# Patient Record
Sex: MALE | Race: WHITE | HISPANIC OR LATINO | Employment: OTHER | URBAN - METROPOLITAN AREA
[De-identification: names, ages, dates, MRNs, and addresses within clinical notes are randomized per-mention and may not be internally consistent; named-entity substitution may affect disease eponyms.]

---

## 2017-08-07 ENCOUNTER — HOSPITAL ENCOUNTER (EMERGENCY)
Facility: HOSPITAL | Age: 57
Discharge: HOME | End: 2017-08-07
Attending: EMERGENCY MEDICINE
Payer: MEDICAID

## 2017-08-07 VITALS
HEART RATE: 95 BPM | TEMPERATURE: 98 F | SYSTOLIC BLOOD PRESSURE: 145 MMHG | OXYGEN SATURATION: 99 % | BODY MASS INDEX: 38.8 KG/M2 | RESPIRATION RATE: 18 BRPM | DIASTOLIC BLOOD PRESSURE: 79 MMHG | HEIGHT: 68 IN | WEIGHT: 256 LBS

## 2017-08-07 DIAGNOSIS — E86.0 DEHYDRATION: Primary | ICD-10-CM

## 2017-08-07 LAB
ALBUMIN SERPL BCP-MCNC: 3.7 G/DL
ALP SERPL-CCNC: 88 U/L
ALT SERPL W/O P-5'-P-CCNC: 18 U/L
ANION GAP SERPL CALC-SCNC: 4 MMOL/L
AST SERPL-CCNC: 17 U/L
BASOPHILS # BLD AUTO: 0.03 K/UL
BASOPHILS NFR BLD: 0.3 %
BILIRUB SERPL-MCNC: 0.2 MG/DL
BILIRUB UR QL STRIP: NEGATIVE
BUN SERPL-MCNC: 17 MG/DL
CALCIUM SERPL-MCNC: 9.8 MG/DL
CHLORIDE SERPL-SCNC: 105 MMOL/L
CK SERPL-CCNC: 220 U/L
CLARITY UR: CLEAR
CO2 SERPL-SCNC: 31 MMOL/L
COLOR UR: YELLOW
CREAT SERPL-MCNC: 1 MG/DL
DIFFERENTIAL METHOD: ABNORMAL
EOSINOPHIL # BLD AUTO: 0.2 K/UL
EOSINOPHIL NFR BLD: 1.9 %
ERYTHROCYTE [DISTWIDTH] IN BLOOD BY AUTOMATED COUNT: 13.2 %
EST. GFR  (AFRICAN AMERICAN): >60 ML/MIN/1.73 M^2
EST. GFR  (NON AFRICAN AMERICAN): >60 ML/MIN/1.73 M^2
GLUCOSE SERPL-MCNC: 91 MG/DL
GLUCOSE UR QL STRIP: NEGATIVE
HCT VFR BLD AUTO: 45.5 %
HGB BLD-MCNC: 14.6 G/DL
HGB UR QL STRIP: NEGATIVE
KETONES UR QL STRIP: NEGATIVE
LEUKOCYTE ESTERASE UR QL STRIP: ABNORMAL
LIPASE SERPL-CCNC: 17 U/L
LYMPHOCYTES # BLD AUTO: 3.4 K/UL
LYMPHOCYTES NFR BLD: 28.8 %
MAGNESIUM SERPL-MCNC: 2.1 MG/DL
MCH RBC QN AUTO: 29.7 PG
MCHC RBC AUTO-ENTMCNC: 32.1 G/DL
MCV RBC AUTO: 93 FL
MICROSCOPIC COMMENT: NORMAL
MONOCYTES # BLD AUTO: 1.3 K/UL
MONOCYTES NFR BLD: 11.5 %
NEUTROPHILS # BLD AUTO: 6.7 K/UL
NEUTROPHILS NFR BLD: 57.5 %
NITRITE UR QL STRIP: NEGATIVE
PH UR STRIP: 5 [PH] (ref 5–8)
PLATELET # BLD AUTO: 247 K/UL
PMV BLD AUTO: 10.9 FL
POCT GLUCOSE: 112 MG/DL (ref 70–110)
POTASSIUM SERPL-SCNC: 4.3 MMOL/L
PROT SERPL-MCNC: 8.2 G/DL
PROT UR QL STRIP: NEGATIVE
RBC # BLD AUTO: 4.91 M/UL
RBC #/AREA URNS HPF: 1 /HPF (ref 0–4)
SODIUM SERPL-SCNC: 140 MMOL/L
SP GR UR STRIP: 1.02 (ref 1–1.03)
URN SPEC COLLECT METH UR: ABNORMAL
UROBILINOGEN UR STRIP-ACNC: NEGATIVE EU/DL
WBC # BLD AUTO: 11.65 K/UL
WBC #/AREA URNS HPF: 1 /HPF (ref 0–5)

## 2017-08-07 PROCEDURE — 82550 ASSAY OF CK (CPK): CPT

## 2017-08-07 PROCEDURE — 85025 COMPLETE CBC W/AUTO DIFF WBC: CPT

## 2017-08-07 PROCEDURE — 82962 GLUCOSE BLOOD TEST: CPT

## 2017-08-07 PROCEDURE — 83735 ASSAY OF MAGNESIUM: CPT

## 2017-08-07 PROCEDURE — 83690 ASSAY OF LIPASE: CPT

## 2017-08-07 PROCEDURE — 80053 COMPREHEN METABOLIC PANEL: CPT

## 2017-08-07 PROCEDURE — 99283 EMERGENCY DEPT VISIT LOW MDM: CPT | Mod: 25

## 2017-08-07 PROCEDURE — 81000 URINALYSIS NONAUTO W/SCOPE: CPT

## 2017-08-07 PROCEDURE — 96360 HYDRATION IV INFUSION INIT: CPT

## 2017-08-07 PROCEDURE — 25000003 PHARM REV CODE 250: Performed by: NURSE PRACTITIONER

## 2017-08-07 RX ADMIN — SODIUM CHLORIDE 1000 ML: 0.9 INJECTION, SOLUTION INTRAVENOUS at 07:08

## 2017-08-07 NOTE — ED PROVIDER NOTES
Triage Assessment:  I have evaluated and performed a medical screening assessment on this patient while awaiting a thorough evaluation and treatment in an ED bed. All of the emergency department beds are occupied at this time. When appropriate, laboratory testing and radiology studies will be ordered from triage. The patient has been advised of this process and care plan.    This is a 57 year old male with HTN and DM who presents to the ED with multiple medical complaints. These include generalized muscle cramps over the last 2-3 yrs as well as bilateral eye redness with drainage. Also complaints of left upper abdominal cramping. No N/V/D. He works in construction. He denies CP, SOB but does report intermittent wheezing. No wheezing right now.     Orders pending:  Labs    Dispo:  Maxwell Stokes NP  08/07/17 [id number]

## 2017-08-08 NOTE — ED PROVIDER NOTES
Encounter Date: 8/7/2017       History     Chief Complaint   Patient presents with    Spasms     muscle spasms to both leg, lower back, abdominal area x few years; more constant lately     CC: Muscle spasms    History of present illness: Patient is a 57-year-old male who presents for emergent consideration of muscle spasms that lasted for several years.  He states that they are in all major muscle groups intermittent nothing alleviates nothing aggravates he reports they're worsening and current severity is 5/10.  He has a history is negative for diabetes mellitus and hypertension.  He works in construction sites and renovating homes.    The history is provided by the patient. No  was used.     Review of patient's allergies indicates:  No Known Allergies  Past Medical History:   Diagnosis Date    Diabetes mellitus     Hypertension      History reviewed. No pertinent surgical history.  History reviewed. No pertinent family history.  Social History   Substance Use Topics    Smoking status: Current Every Day Smoker     Packs/day: 1.00    Smokeless tobacco: Never Used    Alcohol use No     Review of Systems   Constitutional: Negative for appetite change, chills, diaphoresis, fatigue and fever.   HENT: Negative for congestion, ear discharge, ear pain, postnasal drip, rhinorrhea, sinus pressure, sneezing, sore throat and voice change.    Eyes: Negative for discharge, itching and visual disturbance.   Respiratory: Negative for cough, shortness of breath and wheezing.    Cardiovascular: Negative for chest pain, palpitations and leg swelling.   Gastrointestinal: Negative for abdominal pain, nausea and vomiting.   Endocrine: Negative for polydipsia, polyphagia and polyuria.   Genitourinary: Negative for difficulty urinating, discharge, dysuria, frequency, hematuria, penile pain, penile swelling and urgency.   Musculoskeletal: Negative for arthralgias and myalgias.        Muscle spasms   Skin: Negative  for rash and wound.   Neurological: Negative for dizziness, seizures, syncope and weakness.   Hematological: Negative for adenopathy. Does not bruise/bleed easily.   Psychiatric/Behavioral: Negative for agitation and self-injury. The patient is not nervous/anxious.        Physical Exam     Initial Vitals [08/07/17 1657]   BP Pulse Resp Temp SpO2   (!) 147/86 103 20 98.3 °F (36.8 °C) 97 %      MAP       106.33         Physical Exam    Nursing note and vitals reviewed.  Constitutional: He appears well-developed and well-nourished. He is not diaphoretic. No distress.   HENT:   Head: Normocephalic and atraumatic.   Right Ear: External ear normal.   Left Ear: External ear normal.   Nose: Nose normal.   Eyes: Pupils are equal, round, and reactive to light. Right eye exhibits no discharge. Left eye exhibits no discharge. No scleral icterus.   Neck: Normal range of motion.   Pulmonary/Chest: No respiratory distress.   Abdominal: He exhibits no distension.   Musculoskeletal: Normal range of motion.   Neurological: He is alert and oriented to person, place, and time.   Skin: Skin is dry. Capillary refill takes less than 2 seconds.         ED Course   Procedures  Labs Reviewed   CBC W/ AUTO DIFFERENTIAL - Abnormal; Notable for the following:        Result Value    Mono # 1.3 (*)     All other components within normal limits    Narrative:     Recoll. [id number] by LM1 at 08/07/2017 18:36, reason:   HEMOLYZED/DISCARDED/KING   COMPREHENSIVE METABOLIC PANEL - Abnormal; Notable for the following:     CO2 31 (*)     Anion Gap 4 (*)     All other components within normal limits    Narrative:     Recoll. [id number] by LM1 at 08/07/2017 18:36, reason:   HEMOLYZED/DISCARDED/KING   CK - Abnormal; Notable for the following:      (*)     All other components within normal limits    Narrative:     Recoll. [id number] by LM1 at 08/07/2017 18:36, reason:   HEMOLYZED/DISCARDED/KING   URINALYSIS - Abnormal; Notable for the following:      Leukocytes, UA Trace (*)     All other components within normal limits   POCT GLUCOSE - Abnormal; Notable for the following:     POCT Glucose 112 (*)     All other components within normal limits   LIPASE    Narrative:     Recoll. [id number] by SARANGary at 08/07/2017 18:36, reason:   HEMOLYZED/DISCARDED/KING   MAGNESIUM   URINALYSIS MICROSCOPIC   POCT GLUCOSE MONITORING CONTINUOUS             Medical Decision Making:   Initial Assessment:   57 y.o. male presents for emergent evaluation of muscle spasms  Differential Diagnosis:   Dehydration, rhabdomyolysis, muscle spasms  ED Management:  Following a thorough history and physical, the patient underwent laboratory testing including urinalysis which was negative for infection including leukocytes, nitrites, protein, occult blood. UA is negative for infection, no nitrites, leukocytes, blood, or protein present.CBC was negative for leukocytosis  indicated negativity for systemic infection the H&H was stable and within normal limits and indicating negativity for anemia.The chemistry was negative for hypo-or hyper natremia, kalemia, chloridemia, or other electrolyte abnormalities; BUN and creatinine were within normal limits indicating normal kidney function, ALT and AST were within normal limits indicating normal liver function, there was no transaminitis.       CPK was 220 prior to IV rehydration glucose was 112.  Patient was given 1 L normal saline IV prior to discharge and was sent home with instructions to remain well-hydrated drinking fluids that  contain electrolytes while working and to take frequent breaks were working in the heat.  He should follow-up with his primary care provider or this issue or return to the emergency department for nonresolution.   Care of the patient discussed with Dr. Mitchell who agreed with the assessment and plan.                     ED Course     Clinical Impression:   The encounter diagnosis was Dehydration.    Disposition:    Disposition: Discharged  Condition: Stable                        Chris Quiñones, ADALBERTO  08/07/17 2032

## 2017-08-08 NOTE — DISCHARGE INSTRUCTIONS
Try to drink fluids that contain electrolytes when working.  Return to the Emergency department for any worsening or failure to improve, otherwise follow up with your primary care provider.

## 2018-05-25 ENCOUNTER — HOSPITAL ENCOUNTER (EMERGENCY)
Facility: HOSPITAL | Age: 58
Discharge: HOME | End: 2018-05-25
Attending: EMERGENCY MEDICINE
Payer: MEDICAID

## 2018-05-25 VITALS
HEIGHT: 68 IN | RESPIRATION RATE: 16 BRPM | BODY MASS INDEX: 40.32 KG/M2 | HEART RATE: 91 BPM | OXYGEN SATURATION: 96 % | SYSTOLIC BLOOD PRESSURE: 170 MMHG | DIASTOLIC BLOOD PRESSURE: 105 MMHG | TEMPERATURE: 99 F | WEIGHT: 266 LBS

## 2018-05-25 DIAGNOSIS — M10.9 GOUT OF ANKLE, UNSPECIFIED CAUSE, UNSPECIFIED CHRONICITY, UNSPECIFIED LATERALITY: ICD-10-CM

## 2018-05-25 DIAGNOSIS — I10 HYPERTENSION, UNSPECIFIED TYPE: ICD-10-CM

## 2018-05-25 DIAGNOSIS — Z76.0 MEDICATION REFILL: ICD-10-CM

## 2018-05-25 DIAGNOSIS — K02.9 PAIN DUE TO DENTAL CARIES: Primary | ICD-10-CM

## 2018-05-25 LAB — POCT GLUCOSE: 118 MG/DL (ref 70–110)

## 2018-05-25 PROCEDURE — 25000003 PHARM REV CODE 250: Performed by: PHYSICIAN ASSISTANT

## 2018-05-25 PROCEDURE — 82962 GLUCOSE BLOOD TEST: CPT

## 2018-05-25 PROCEDURE — 99284 EMERGENCY DEPT VISIT MOD MDM: CPT | Mod: 25

## 2018-05-25 PROCEDURE — 63600175 PHARM REV CODE 636 W HCPCS: Performed by: PHYSICIAN ASSISTANT

## 2018-05-25 RX ORDER — PREDNISONE 20 MG/1
20 TABLET ORAL DAILY
Qty: 4 TABLET | Refills: 0 | Status: SHIPPED | OUTPATIENT
Start: 2018-05-25 | End: 2018-05-29

## 2018-05-25 RX ORDER — DEXTROMETHORPHAN HYDROBROMIDE, GUAIFENESIN 5; 100 MG/5ML; MG/5ML
650 LIQUID ORAL EVERY 8 HOURS PRN
Qty: 30 TABLET | Refills: 0 | Status: SHIPPED | OUTPATIENT
Start: 2018-05-25

## 2018-05-25 RX ORDER — PREDNISONE 20 MG/1
20 TABLET ORAL
Status: COMPLETED | OUTPATIENT
Start: 2018-05-25 | End: 2018-05-25

## 2018-05-25 RX ORDER — LISINOPRIL 40 MG/1
40 TABLET ORAL DAILY
Qty: 30 TABLET | Refills: 0 | Status: SHIPPED | OUTPATIENT
Start: 2018-05-25 | End: 2020-03-01

## 2018-05-25 RX ORDER — ACETAMINOPHEN 500 MG
1000 TABLET ORAL
Status: COMPLETED | OUTPATIENT
Start: 2018-05-25 | End: 2018-05-25

## 2018-05-25 RX ORDER — PENICILLIN V POTASSIUM 250 MG/1
500 TABLET, FILM COATED ORAL
Status: COMPLETED | OUTPATIENT
Start: 2018-05-25 | End: 2018-05-25

## 2018-05-25 RX ORDER — PENICILLIN V POTASSIUM 500 MG/1
500 TABLET, FILM COATED ORAL 4 TIMES DAILY
Qty: 28 TABLET | Refills: 0 | Status: SHIPPED | OUTPATIENT
Start: 2018-05-25 | End: 2018-06-01

## 2018-05-25 RX ADMIN — ACETAMINOPHEN 1000 MG: 500 TABLET, FILM COATED ORAL at 10:05

## 2018-05-25 RX ADMIN — PENICILLIN V POTASSIUM 500 MG: 250 TABLET, FILM COATED ORAL at 10:05

## 2018-05-25 RX ADMIN — PREDNISONE 20 MG: 20 TABLET ORAL at 10:05

## 2018-05-26 NOTE — ED NOTES
Pt reports he has been taking aspirin for pain.  Pt reports his doctor no longer takes medicaid so he has been out of blood pressure medication.

## 2018-05-26 NOTE — ED PROVIDER NOTES
Encounter Date: 5/25/2018       History     Chief Complaint   Patient presents with    Jaw Pain    Dental Pain     Pt c/o bottom front teeth pain that now has his whole jaw and ears hurting.     Leg Pain     Pt c/o of bilateral leg pain.     Otalgia     Pt reports teeth pain that now radiates to his jaw and ears.      This is a 58-year-old male with hypertension, diabetes, gout who complains mouth pain that started with the lower incisors and has extended in both directions, and now with upper incisor pain. He reports pain is worse with eating and drinking, particularly cold drinks. He also reports bilateral ankle pain x1 week, similar to previous gout attacks. He has been off of his allopurinol. He denies injury, fever, rash.   Pt explains he had a PCP but they stopped taking medicaid and he has been out of his lisinopril for over 3 months.           Review of patient's allergies indicates:  No Known Allergies  Past Medical History:   Diagnosis Date    Diabetes mellitus     Hypertension      History reviewed. No pertinent surgical history.  History reviewed. No pertinent family history.  Social History   Substance Use Topics    Smoking status: Current Every Day Smoker     Packs/day: 1.00    Smokeless tobacco: Never Used    Alcohol use No     Review of Systems   Constitutional: Negative for fever.   HENT: Positive for dental problem. Negative for facial swelling, sore throat, trouble swallowing and voice change.    Respiratory: Negative for shortness of breath.    Cardiovascular: Negative for chest pain.   Gastrointestinal: Negative for nausea.   Genitourinary: Negative for dysuria.   Musculoskeletal: Positive for arthralgias (ankles) and joint swelling (ankles). Negative for back pain.   Skin: Negative for rash.   Neurological: Negative for weakness.   Hematological: Does not bruise/bleed easily.       Physical Exam     Initial Vitals [05/25/18 2154]   BP Pulse Resp Temp SpO2   (!) 170/105 91 16 98.6 °F  (37 °C) 96 %      MAP       126.67         Physical Exam    Vitals reviewed.  Constitutional: He appears well-developed and well-nourished. He is not diaphoretic. No distress.   HENT:   Head: Normocephalic and atraumatic.   Right Ear: External ear normal.   Left Ear: External ear normal.   Nose: Nose normal.   Eyes: Conjunctivae are normal. No scleral icterus.   Neck: Normal range of motion. Neck supple.   Cardiovascular: Normal rate, regular rhythm, normal heart sounds and intact distal pulses.   Pulmonary/Chest: Breath sounds normal. No respiratory distress. He has no wheezes. He has no rhonchi. He has no rales. He exhibits no tenderness.   Abdominal: Soft. He exhibits no distension and no mass. There is no tenderness. There is no rebound and no guarding.   Musculoskeletal: Normal range of motion. He exhibits edema and tenderness.   Bilateral ankles with mild edema and erythema.  Both are tender.  Normal range of motion.  Patient ambulatory without limp.     Neurological: He is alert and oriented to person, place, and time.   Skin: Skin is warm and dry. No rash noted. No erythema.         ED Course   Procedures  Labs Reviewed   POCT GLUCOSE - Abnormal; Notable for the following:        Result Value    POCT Glucose 118 (*)     All other components within normal limits             Medical Decision Making:   Initial Assessment:   50-year-old male with hypertension diabetes, and gout, with dental pain in mild acute gout flare.  No evidence of dental abscess or other significant odontogenic infection.  Bilateral ankles with mild tenderness, edema, erythema.  No fever chills. Low suspicion for infection.  Patient's pain same as previous gout flares.  Patient has not been on allopurinol.  Blood pressure elevated.  No chest pain or shortness of breath. He has been out of his lisinopril for 3 months, which he normally takes 40 mg daily.  No evidence of end-organ damage on exam.    ED Management:  Patient treated with  penicillin for dental pain and instructed to make appointment ASAP with to this.  Glucose is 118.  Will treat patient's gout flare with low-dose prednisone and instruct frequent glucose checks.  Will refill lisinopril, and provide PCP resources for patient to establish care and continue management of chronic conditions.  Patient discharged with return precautions.  He verbalized understanding and agreed with plan.                      Clinical Impression:   The primary encounter diagnosis was Pain due to dental caries. Diagnoses of Gout of ankle, unspecified cause, unspecified chronicity, unspecified laterality, Medication refill, and Hypertension, unspecified type were also pertinent to this visit.                           OTF GuzmánC  05/27/18 0029

## 2018-05-26 NOTE — ED TRIAGE NOTES
Pt presents to ED c/o dental pain to lower front teeth that radiates to jaw and cherri ears. Also c/o cherri leg pain. Pt requesting medication for gout.

## 2019-01-31 ENCOUNTER — HOSPITAL ENCOUNTER (EMERGENCY)
Facility: HOSPITAL | Age: 59
Discharge: HOME | End: 2019-01-31
Attending: EMERGENCY MEDICINE
Payer: MEDICAID

## 2019-01-31 VITALS
HEIGHT: 68 IN | TEMPERATURE: 98 F | OXYGEN SATURATION: 96 % | HEART RATE: 80 BPM | RESPIRATION RATE: 20 BRPM | WEIGHT: 266 LBS | DIASTOLIC BLOOD PRESSURE: 106 MMHG | SYSTOLIC BLOOD PRESSURE: 174 MMHG | BODY MASS INDEX: 40.32 KG/M2

## 2019-01-31 DIAGNOSIS — E86.0 DEHYDRATION: ICD-10-CM

## 2019-01-31 DIAGNOSIS — M25.531 WRIST PAIN, RIGHT: ICD-10-CM

## 2019-01-31 DIAGNOSIS — M25.561 RIGHT KNEE PAIN: Primary | ICD-10-CM

## 2019-01-31 DIAGNOSIS — M19.031 OSTEOARTHRITIS OF RIGHT WRIST, UNSPECIFIED OSTEOARTHRITIS TYPE: ICD-10-CM

## 2019-01-31 DIAGNOSIS — R91.1 NODULE OF LEFT LUNG: Primary | ICD-10-CM

## 2019-01-31 DIAGNOSIS — R05.9 COUGH: ICD-10-CM

## 2019-01-31 LAB
ALBUMIN SERPL BCP-MCNC: 4 G/DL
ALP SERPL-CCNC: 88 U/L
ALT SERPL W/O P-5'-P-CCNC: 15 U/L
AMORPH CRY URNS QL MICRO: NORMAL
ANION GAP SERPL CALC-SCNC: 9 MMOL/L
AST SERPL-CCNC: 19 U/L
BASOPHILS # BLD AUTO: 0.04 K/UL
BASOPHILS NFR BLD: 0.3 %
BILIRUB SERPL-MCNC: 0.4 MG/DL
BILIRUB UR QL STRIP: NEGATIVE
BUN SERPL-MCNC: 17 MG/DL
CALCIUM SERPL-MCNC: 9.7 MG/DL
CHLORIDE SERPL-SCNC: 103 MMOL/L
CK SERPL-CCNC: 337 U/L
CLARITY UR: CLEAR
CO2 SERPL-SCNC: 28 MMOL/L
COLOR UR: YELLOW
CREAT SERPL-MCNC: 1.1 MG/DL
DIFFERENTIAL METHOD: ABNORMAL
EOSINOPHIL # BLD AUTO: 0.3 K/UL
EOSINOPHIL NFR BLD: 2.3 %
ERYTHROCYTE [DISTWIDTH] IN BLOOD BY AUTOMATED COUNT: 13.4 %
EST. GFR  (AFRICAN AMERICAN): >60 ML/MIN/1.73 M^2
EST. GFR  (NON AFRICAN AMERICAN): >60 ML/MIN/1.73 M^2
GLUCOSE SERPL-MCNC: 99 MG/DL
GLUCOSE UR QL STRIP: NEGATIVE
HCT VFR BLD AUTO: 45.4 %
HGB BLD-MCNC: 14.7 G/DL
HGB UR QL STRIP: NEGATIVE
KETONES UR QL STRIP: NEGATIVE
LEUKOCYTE ESTERASE UR QL STRIP: NEGATIVE
LIPASE SERPL-CCNC: 14 U/L
LYMPHOCYTES # BLD AUTO: 3.8 K/UL
LYMPHOCYTES NFR BLD: 28.8 %
MAGNESIUM SERPL-MCNC: 2.4 MG/DL
MCH RBC QN AUTO: 28.8 PG
MCHC RBC AUTO-ENTMCNC: 32.4 G/DL
MCV RBC AUTO: 89 FL
MICROSCOPIC COMMENT: NORMAL
MONOCYTES # BLD AUTO: 1.4 K/UL
MONOCYTES NFR BLD: 10.2 %
NEUTROPHILS # BLD AUTO: 7.8 K/UL
NEUTROPHILS NFR BLD: 58.3 %
NITRITE UR QL STRIP: NEGATIVE
PH UR STRIP: 5 [PH] (ref 5–8)
PLATELET # BLD AUTO: 254 K/UL
PMV BLD AUTO: 10.1 FL
POTASSIUM SERPL-SCNC: 4.7 MMOL/L
PROT SERPL-MCNC: 8.1 G/DL
PROT UR QL STRIP: NEGATIVE
RBC # BLD AUTO: 5.1 M/UL
RBC #/AREA URNS HPF: 1 /HPF (ref 0–4)
SODIUM SERPL-SCNC: 140 MMOL/L
SP GR UR STRIP: 1.02 (ref 1–1.03)
SQUAMOUS #/AREA URNS HPF: 2 /HPF
URN SPEC COLLECT METH UR: NORMAL
UROBILINOGEN UR STRIP-ACNC: NEGATIVE EU/DL
WBC # BLD AUTO: 13.34 K/UL

## 2019-01-31 PROCEDURE — 83690 ASSAY OF LIPASE: CPT

## 2019-01-31 PROCEDURE — 81000 URINALYSIS NONAUTO W/SCOPE: CPT

## 2019-01-31 PROCEDURE — 25000003 PHARM REV CODE 250: Performed by: NURSE PRACTITIONER

## 2019-01-31 PROCEDURE — 80053 COMPREHEN METABOLIC PANEL: CPT

## 2019-01-31 PROCEDURE — 83735 ASSAY OF MAGNESIUM: CPT

## 2019-01-31 PROCEDURE — 94640 AIRWAY INHALATION TREATMENT: CPT

## 2019-01-31 PROCEDURE — 96360 HYDRATION IV INFUSION INIT: CPT

## 2019-01-31 PROCEDURE — 99284 EMERGENCY DEPT VISIT MOD MDM: CPT | Mod: 25

## 2019-01-31 PROCEDURE — 85025 COMPLETE CBC W/AUTO DIFF WBC: CPT

## 2019-01-31 PROCEDURE — 82550 ASSAY OF CK (CPK): CPT

## 2019-01-31 PROCEDURE — 25000242 PHARM REV CODE 250 ALT 637 W/ HCPCS: Performed by: NURSE PRACTITIONER

## 2019-01-31 RX ORDER — IBUPROFEN 800 MG/1
800 TABLET ORAL 3 TIMES DAILY
COMMUNITY

## 2019-01-31 RX ORDER — ALBUTEROL SULFATE 2.5 MG/.5ML
2.5 SOLUTION RESPIRATORY (INHALATION)
Status: COMPLETED | OUTPATIENT
Start: 2019-01-31 | End: 2019-01-31

## 2019-01-31 RX ORDER — DOXYCYCLINE 100 MG/1
100 CAPSULE ORAL 2 TIMES DAILY
COMMUNITY

## 2019-01-31 RX ADMIN — ALBUTEROL SULFATE 2.5 MG: 2.5 SOLUTION RESPIRATORY (INHALATION) at 05:01

## 2019-01-31 RX ADMIN — SODIUM CHLORIDE 1000 ML: 0.9 INJECTION, SOLUTION INTRAVENOUS at 06:01

## 2019-01-31 NOTE — ED TRIAGE NOTES
Patient presents to the ED via personal transportation with family. Patient reports bilateral wrist pain, right flank muscle spasms, and left upper leg muscles x 2 weeks. Patient reports that wrist pain started after a fall, landing with arms extended. Patient states that muscle spasms come and go, and that last time he had them, he had to have fluids for dehydration.

## 2019-01-31 NOTE — ED PROVIDER NOTES
"Encounter Date: 1/31/2019    SCRIBE #1 NOTE: I, Myra Cami, am scribing for, and in the presence of,  Agnes Damon NP. I have scribed the following portions of the note - Other sections scribed: HPI, ROS.       History     Chief Complaint   Patient presents with    Wrist Pain     Patient reiports bilateral wrist pain x "a couple of weeks", and muscle spasms to right flank area and left upper leg x 1 week. Patient did fall about 2 weeks ago and lands with arm extended out. Denies loc, hitting head.     CC: Wrist Pain    HPI: This is a 60 y/o male with PMHx of DM, Diverticulitis, Gout, Hyperlipemia, and HTN who presents to the ED c/o acute onset bilateral wrist pain, mostly on right side, that began x2 weeks ago after fall . Pt also muscle spasms to right flank area and posterior aspect of upper left leg that began 3-4 weeks ago. Pt reports that his toes also get cramps in them. Pt notes that he was seen for this in the past and given IV fluids for dehydration, which relieved the cramps. Pt reports that he went to his PCP yesterday for SOB and congestion, but forgot to mention the muscle spasms and wrist pain. Pt notes that he was given an albuterol inhaler and nebulizer yesterday but not able to pick them up yet. Pt still c/o subjective fever, chills, congestion, rhinorrhea, chest pain, shortness of breath, and nonproductive cough. Denies N/V/D, urinary symptoms, or further symptoms.    Meds noted: Metformin, Lisinopril, Suboxone (for chronic neck and back pain)  Social hx: Pt smokes cigarettes (0.5 packs/day)      The history is provided by the patient. No  was used.     Review of patient's allergies indicates:   Allergen Reactions    Dye Rash     Past Medical History:   Diagnosis Date    Diabetes mellitus     Diverticulitis     Gout     Hyperlipemia     Hypertension      History reviewed. No pertinent surgical history.  History reviewed. No pertinent family history.  Social " History     Tobacco Use    Smoking status: Current Every Day Smoker     Packs/day: 1.00    Smokeless tobacco: Never Used   Substance Use Topics    Alcohol use: No    Drug use: No     Review of Systems   Constitutional: Positive for chills and fever (subjective).   HENT: Positive for congestion, rhinorrhea and sore throat. Negative for ear pain.    Eyes: Negative for pain and visual disturbance.   Respiratory: Positive for cough. Negative for shortness of breath.    Cardiovascular: Negative for chest pain.   Gastrointestinal: Negative for abdominal pain, diarrhea, nausea and vomiting.   Genitourinary: Negative for dysuria.   Musculoskeletal: Positive for back pain (chronic) and neck pain (chronic).        (+) cramping to right flank area, left upper leg, and toes  (+) bilateral wrist pain, more on right   Skin: Negative for rash.   Neurological: Negative for headaches.       Physical Exam     Initial Vitals [01/31/19 1637]   BP Pulse Resp Temp SpO2   (!) 143/72 68 20 98.7 °F (37.1 °C) 95 %      MAP       --         Physical Exam    Vitals reviewed.  Constitutional: He appears well-developed and well-nourished. He is not diaphoretic.  Non-toxic appearance. He does not have a sickly appearance. He does not appear ill. No distress.   HENT:   Head: Normocephalic and atraumatic.   Right Ear: Hearing, tympanic membrane, external ear and ear canal normal.   Left Ear: Hearing, tympanic membrane, external ear and ear canal normal.   Nose: Mucosal edema and rhinorrhea present.   Mouth/Throat: Oropharynx is clear and moist. No oropharyngeal exudate.   Eyes: Conjunctivae and EOM are normal. Pupils are equal, round, and reactive to light. Right eye exhibits no discharge. Left eye exhibits no discharge.   Neck: Trachea normal, normal range of motion, full passive range of motion without pain and phonation normal. Neck supple.   Cardiovascular: Normal rate, regular rhythm and normal heart sounds.   Pulmonary/Chest: Breath  sounds normal. No respiratory distress.   Abdominal: Soft. Bowel sounds are normal. There is no hepatosplenomegaly. There is no tenderness. There is no rigidity, no rebound, no guarding, no CVA tenderness, no tenderness at McBurney's point and negative Feliz's sign. No hernia.   Musculoskeletal: Normal range of motion.        Right wrist: He exhibits tenderness.        Left wrist: Normal.   Neurological: He is alert and oriented to person, place, and time. GCS eye subscore is 4. GCS verbal subscore is 5. GCS motor subscore is 6.   Skin: Skin is warm, dry and intact. No rash noted. No erythema.   Psychiatric: He has a normal mood and affect. Thought content normal.         ED Course   Procedures  Labs Reviewed   CBC W/ AUTO DIFFERENTIAL - Abnormal; Notable for the following components:       Result Value    WBC 13.34 (*)     Gran # (ANC) 7.8 (*)     Mono # 1.4 (*)     All other components within normal limits   CK - Abnormal; Notable for the following components:     (*)     All other components within normal limits   COMPREHENSIVE METABOLIC PANEL   LIPASE   MAGNESIUM   URINALYSIS, REFLEX TO URINE CULTURE    Narrative:     Preferred Collection Type->Urine, Clean Catch   URINALYSIS MICROSCOPIC    Narrative:     Preferred Collection Type->Urine, Clean Catch          Imaging Results          X-Ray Wrist Complete Right (Final result)  Result time 01/31/19 18:39:02    Final result by Zulema Haley MD (01/31/19 18:39:02)                 Impression:      See above.      Electronically signed by: Zulema Haley MD  Date:    01/31/2019  Time:    18:39             Narrative:    EXAMINATION:  XR WRIST COMPLETE 3 VIEWS RIGHT    CLINICAL HISTORY:  Pain in right wrist    TECHNIQUE:  PA, lateral, and oblique views of the right wrist were performed.    COMPARISON:  None    FINDINGS:  No evidence of acute displaced fracture, dislocation, or osseous destructive process.  Small corticated ossific densities are seen at the  distal aspect of the ulnar styloid process which may reflect remote injury or accessory ossicles.  This does not have the appearance of an acute fracture.  Prominent degenerative changes and spurring seen at the distal ulnar radial joint.  Additional mild degenerative changes are seen within the wrist.                               X-Ray Chest PA And Lateral (Final result)     Abnormal  Result time 01/31/19 18:08:09    Final result by Collin Herrera MD (01/31/19 18:08:09)                 Impression:      No radiographic acute intrathoracic process seen.    Left upper lung zone 4 mm indeterminate nodule.  Further evaluation with elective/nonemergent CT thorax can be obtained, if 2 year stability cannot be confirmed.    This report was flagged in Epic as containing an incidental finding.      Electronically signed by: Collin Herrera MD  Date:    01/31/2019  Time:    18:08             Narrative:    EXAMINATION:  XR CHEST PA AND LATERAL    CLINICAL HISTORY:  Cough    TECHNIQUE:  PA and lateral views of the chest were performed.    COMPARISON:  None    FINDINGS:  Cardiomediastinal silhouette is midline and within normal limits for age.  There is nonspecific elevation of the right hemidiaphragm.  Pulmonary vasculature and hilar regions are within normal limits.  There is a 4 mm nodular density projected over the left upper lung zone at the confluence of the posterior 4th and anterior 2nd ribs seen on the frontal view which could represent a nonspecific sclerotic focus or bone island in 1 of the ribs versus pulmonary nodule.  Few scattered linear opacities at the left lung base suggesting subsegmental scarring versus atelectasis.  The lungs are otherwise well expanded without large consolidation, pleural effusion or pneumothorax.  No acute osseous process seen.                                 Medical Decision Making:   ED Management:  This is evaluation of a 59-year-old male presenting with multiple medical complaints.  Exam, is well appearing.  He is wheezing throughout all lung fields.  Heart with regular rate and rhythm. He is obese.  Abdomen is soft and nontender. CVA tenderness. Right wrist tenderness with palpation.  No obvious deformity.  No overlying erythema or warmth.    Vital signs are reassuring.    Results:  X-ray of the right wrist with no evidence of acute fracture, dislocation.  Prominent degenerative changes seen.  Patient placed in Ace wrap and instructed follow up with Orthopedics regarding this problem.  Chest x-ray PA and lateral with left upper lung zone nodule.  No acute abnormality.  This was discussed with the patient who states that he knew that he had a lung nodule.  He has not followed up regarding this problem.  Encouraged follow up with PCP and/or pulmonology.  Urine without infection.  No CVA tenderness. Doubt UTI, pyelonephritis.  No hematuria.  Doubt stone.  Renal function within normal limits. Doubt acute renal failure.  No other significant abnormality on blood work.  CPK mildly elevated, consistent with dehydration.  No evidence of rhabdomyolysis.    Given IV fluids, breathing treatment with improvement in symptoms. Lung sounds improved.  96% on room air.  None of these symptoms are acute.     Based on my clinical evaluation, I do not appreciate any immediate, emergent, or life threatening condition or etiology that warrants additional workup today.  I feel the patient can be discharged with close follow-up care.    This case was discussed my attending physician who agrees with my plan of care.              Scribe Attestation:   Scribe #1: I performed the above scribed service and the documentation accurately describes the services I performed. I attest to the accuracy of the note.    Attending Attestation:           Physician Attestation for Scribe:  Physician Attestation Statement for Scribe #1: I, Agnes Damon NP, reviewed documentation, as scribed by Myra Almanza in my presence, and it  is both accurate and complete.                    Clinical Impression:   The primary encounter diagnosis was Nodule of left lung. Diagnoses of Wrist pain, right, Cough, Dehydration, and Osteoarthritis of right wrist, unspecified osteoarthritis type were also pertinent to this visit.      Disposition:   Disposition: Discharged  Condition: Stable                        Agnes Damon NP  02/05/19 [id number]

## 2019-02-01 NOTE — DISCHARGE INSTRUCTIONS

## 2019-02-06 ENCOUNTER — HOSPITAL ENCOUNTER (EMERGENCY)
Facility: HOSPITAL | Age: 59
Discharge: HOME | End: 2019-02-06
Attending: EMERGENCY MEDICINE
Payer: MEDICAID

## 2019-02-06 VITALS
TEMPERATURE: 98 F | HEIGHT: 68 IN | SYSTOLIC BLOOD PRESSURE: 110 MMHG | DIASTOLIC BLOOD PRESSURE: 68 MMHG | HEART RATE: 79 BPM | OXYGEN SATURATION: 98 % | BODY MASS INDEX: 40.32 KG/M2 | WEIGHT: 266 LBS | RESPIRATION RATE: 18 BRPM

## 2019-02-06 DIAGNOSIS — R05.9 COUGH: ICD-10-CM

## 2019-02-06 DIAGNOSIS — J10.1 INFLUENZA A: Primary | ICD-10-CM

## 2019-02-06 LAB
CTP QC/QA: YES
FLUAV AG NPH QL: POSITIVE
FLUBV AG NPH QL: NEGATIVE

## 2019-02-06 PROCEDURE — 63600175 PHARM REV CODE 636 W HCPCS: Performed by: NURSE PRACTITIONER

## 2019-02-06 PROCEDURE — 25000003 PHARM REV CODE 250: Performed by: NURSE PRACTITIONER

## 2019-02-06 PROCEDURE — 25000242 PHARM REV CODE 250 ALT 637 W/ HCPCS: Performed by: NURSE PRACTITIONER

## 2019-02-06 PROCEDURE — 99283 EMERGENCY DEPT VISIT LOW MDM: CPT | Mod: 25

## 2019-02-06 PROCEDURE — 94640 AIRWAY INHALATION TREATMENT: CPT

## 2019-02-06 RX ORDER — CODEINE PHOSPHATE AND GUAIFENESIN 10; 100 MG/5ML; MG/5ML
5 SOLUTION ORAL 3 TIMES DAILY PRN
Qty: 118 ML | Refills: 0 | Status: SHIPPED | OUTPATIENT
Start: 2019-02-06 | End: 2019-02-16

## 2019-02-06 RX ORDER — IPRATROPIUM BROMIDE AND ALBUTEROL SULFATE 2.5; .5 MG/3ML; MG/3ML
3 SOLUTION RESPIRATORY (INHALATION)
Status: COMPLETED | OUTPATIENT
Start: 2019-02-06 | End: 2019-02-06

## 2019-02-06 RX ORDER — ALBUTEROL SULFATE 90 UG/1
1-2 AEROSOL, METERED RESPIRATORY (INHALATION) EVERY 6 HOURS PRN
Qty: 1 INHALER | Refills: 0 | Status: SHIPPED | OUTPATIENT
Start: 2019-02-06 | End: 2019-04-16 | Stop reason: SDUPTHER

## 2019-02-06 RX ORDER — HYDROCODONE BITARTRATE AND ACETAMINOPHEN 5; 325 MG/1; MG/1
1 TABLET ORAL
Status: COMPLETED | OUTPATIENT
Start: 2019-02-06 | End: 2019-02-06

## 2019-02-06 RX ORDER — PREDNISONE 20 MG/1
60 TABLET ORAL
Status: COMPLETED | OUTPATIENT
Start: 2019-02-06 | End: 2019-02-06

## 2019-02-06 RX ORDER — METHYLPREDNISOLONE 4 MG/1
TABLET ORAL
Qty: 1 PACKAGE | Refills: 0 | Status: SHIPPED | OUTPATIENT
Start: 2019-02-06 | End: 2019-02-27

## 2019-02-06 RX ADMIN — IPRATROPIUM BROMIDE AND ALBUTEROL SULFATE 3 ML: .5; 3 SOLUTION RESPIRATORY (INHALATION) at 02:02

## 2019-02-06 RX ADMIN — HYDROCODONE BITARTRATE AND ACETAMINOPHEN 1 TABLET: 5; 325 TABLET ORAL at 02:02

## 2019-02-06 RX ADMIN — PREDNISONE 60 MG: 20 TABLET ORAL at 02:02

## 2019-02-06 NOTE — ED PROVIDER NOTES
"Encounter Date: 2/6/2019    SCRIBE #1 NOTE: I, Atif Whaley, am scribing for, and in the presence of,  Fernanda Stokes NP. I have scribed the following portions of the note - Other sections scribed: HPI and ROS.       History     Chief Complaint   Patient presents with    Fever     x 2 days. Pt reports taking ibuprofen. States 'T think i got the flu from the baby".      CC: Fever    HPI: This 59 y.o M smoker (0.5ppd) with HTN and DM presents to the ED c/o acute onset of fever (Tmax 103.0F) and cough x2 days.  The pt also reports nausea, emesis x1 and SOB. He reports several sick contacts at home who have been dx'ed with influenza. The pt was Rx'ed a nebulizer machine, but has not yet received the medication for the machine. He attempted tx with a friends nebulizer machine with no relief. He received a pneumonia vaccine x1 week ago. He did not receive an influenza vaccination. He denies diaphoresis, diarrhea, chest pain, abdominal pain, back pain, dysuria, difficulty urinating, chest pain, dysuria, difficulty urinating and rash. He attempted tx with Ibuprofen x2 hours ago which alleviated his fever.      PCP- [facility]       The history is provided by the patient. No  was used.     Review of patient's allergies indicates:   Allergen Reactions    Dye Rash     Past Medical History:   Diagnosis Date    Diabetes mellitus     Diverticulitis     Gout     Hyperlipemia     Hypertension      History reviewed. No pertinent surgical history.  History reviewed. No pertinent family history.  Social History     Tobacco Use    Smoking status: Current Every Day Smoker     Packs/day: 1.00    Smokeless tobacco: Never Used   Substance Use Topics    Alcohol use: No    Drug use: No     Review of Systems   Constitutional: Positive for fever. Negative for chills and diaphoresis.   HENT: Negative for rhinorrhea and sore throat.    Eyes: Negative for redness.   Respiratory: Positive for cough and " shortness of breath.    Cardiovascular: Negative for chest pain.   Gastrointestinal: Positive for nausea and vomiting. Negative for abdominal pain and diarrhea.   Genitourinary: Negative for dysuria.   Musculoskeletal: Negative for back pain.   Skin: Negative for color change and rash.   Neurological: Negative for dizziness, syncope, weakness and headaches.   Psychiatric/Behavioral: The patient is not nervous/anxious.        Physical Exam     Initial Vitals [02/06/19 1331]   BP Pulse Resp Temp SpO2   110/68 98 18 98.2 °F (36.8 °C) 95 %      MAP       --         Physical Exam    Constitutional: He appears well-developed and well-nourished.  Non-toxic appearance.   HENT:   Head: Normocephalic and atraumatic.   Right Ear: Tympanic membrane normal.   Left Ear: Tympanic membrane normal.   Nose: Mucosal edema and rhinorrhea present.   Mouth/Throat: Uvula is midline and mucous membranes are normal. No trismus in the jaw. Posterior oropharyngeal erythema present. No oropharyngeal exudate or tonsillar abscesses.   Eyes: EOM are normal.   Neck: Full passive range of motion without pain. Neck supple. No neck rigidity.   Cardiovascular: Normal rate, S1 normal, S2 normal and normal heart sounds. Exam reveals no gallop.    No murmur heard.  Pulmonary/Chest: Effort normal. No tachypnea. He has no decreased breath sounds. He has wheezes. He has rhonchi. He has no rales.   Abdominal: Soft. Normal appearance. There is no tenderness. There is no CVA tenderness, no tenderness at McBurney's point and negative Feliz's sign.   Lymphadenopathy:     He has cervical adenopathy.        Right cervical: Superficial cervical adenopathy present.        Left cervical: Superficial cervical adenopathy present.   Neurological: He is alert and oriented to person, place, and time. GCS eye subscore is 4. GCS verbal subscore is 5. GCS motor subscore is 6.   Skin: Skin is warm, dry and intact. No rash noted.   Psychiatric: He has a normal mood and  affect.         ED Course   Procedures  Labs Reviewed   POCT INFLUENZA A/B - Abnormal; Notable for the following components:       Result Value    Rapid Influenza A Ag Positive (*)     All other components within normal limits          Imaging Results          X-Ray Chest PA And Lateral (Final result)  Result time 02/06/19 14:50:46    Final result by Jenni Torres MD (02/06/19 14:50:46)                 Impression:      No definite acute intrathoracic process seen.  No change from the prior study.      Electronically signed by: Jenni Torres MD  Date:    02/06/2019  Time:    14:50             Narrative:    EXAMINATION:  XR CHEST PA AND LATERAL    CLINICAL HISTORY:  Cough    TECHNIQUE:  PA and lateral views of the chest were performed.    COMPARISON:  01/31/2019    FINDINGS:  The cardiac silhouette is normal in size, midline.  The pulmonary vascularity appears normal.  No definite focal area of airspace consolidation.  No pleural effusion.  No pneumothorax.  High density area projecting over the left 3rd rib, again seen suggestive of a granuloma or small bone island.                                 Medical Decision Making:   ED Management:  This is a 59-year-old male who presents to the ED with complaints of fever, cough and congestion.  He is febrile and nontoxic appearing.  On exam, breath sounds are coarse with wheezing.  He is a heavy smoker.  Influenza a positive. Chest x-ray negative.  Patient given DuoNeb and steroids in the ED with good relief.  No evidence of respiratory distress or bacterial infection.  Likely acute bronchitis with bronchospasm, viral.  Discharged home with prescriptions for cough syrup, albuterol inhaler and Medrol Dosepak.  Instructions given for supportive care and follow-up.  Return precautions given.            Scribe Attestation:   Scribe #1: I performed the above scribed service and the documentation accurately describes the services I performed. I attest to the accuracy  of the note.    Attending Attestation:           Physician Attestation for Scribe:  Physician Attestation Statement for Scribe #1: I, Fernanda Stokes NP, reviewed documentation, as scribed by Atif Whaley in my presence, and it is both accurate and complete.                    Clinical Impression:   The primary encounter diagnosis was Influenza A. A diagnosis of Cough was also pertinent to this visit.      Disposition:   Disposition: Discharged  Condition: Stable                        Fernanda Stokes NP  02/06/19 6434

## 2019-02-06 NOTE — DISCHARGE INSTRUCTIONS
Please return to the ED for any new or worsening symptoms: chest pain, shortness of breath, loss of consciousness or any other concerns. Please follow up with primary care within in the week. You may also call [phone] for the [facility] same day appointment line.

## 2019-04-16 ENCOUNTER — HOSPITAL ENCOUNTER (EMERGENCY)
Facility: HOSPITAL | Age: 59
Discharge: HOME | End: 2019-04-16
Attending: EMERGENCY MEDICINE
Payer: MEDICAID

## 2019-04-16 VITALS
WEIGHT: 257 LBS | HEART RATE: 89 BPM | RESPIRATION RATE: 19 BRPM | OXYGEN SATURATION: 94 % | HEIGHT: 68 IN | TEMPERATURE: 98 F | SYSTOLIC BLOOD PRESSURE: 162 MMHG | BODY MASS INDEX: 38.95 KG/M2 | DIASTOLIC BLOOD PRESSURE: 90 MMHG

## 2019-04-16 DIAGNOSIS — R06.02 SOB (SHORTNESS OF BREATH): ICD-10-CM

## 2019-04-16 DIAGNOSIS — M25.569 KNEE PAIN, ACUTE: ICD-10-CM

## 2019-04-16 DIAGNOSIS — J44.1 COPD WITH ACUTE EXACERBATION: Primary | ICD-10-CM

## 2019-04-16 PROCEDURE — 25000242 PHARM REV CODE 250 ALT 637 W/ HCPCS: Performed by: EMERGENCY MEDICINE

## 2019-04-16 PROCEDURE — 94761 N-INVAS EAR/PLS OXIMETRY MLT: CPT

## 2019-04-16 PROCEDURE — 99284 EMERGENCY DEPT VISIT MOD MDM: CPT | Mod: 25

## 2019-04-16 PROCEDURE — 63600175 PHARM REV CODE 636 W HCPCS: Performed by: EMERGENCY MEDICINE

## 2019-04-16 PROCEDURE — 94640 AIRWAY INHALATION TREATMENT: CPT

## 2019-04-16 RX ORDER — ALBUTEROL SULFATE 2.5 MG/.5ML
10 SOLUTION RESPIRATORY (INHALATION)
Status: COMPLETED | OUTPATIENT
Start: 2019-04-16 | End: 2019-04-16

## 2019-04-16 RX ORDER — PREDNISONE 10 MG/1
TABLET ORAL
Qty: 36 TABLET | Refills: 0 | OUTPATIENT
Start: 2019-04-16 | End: 2019-11-17

## 2019-04-16 RX ORDER — IPRATROPIUM BROMIDE 0.5 MG/2.5ML
1 SOLUTION RESPIRATORY (INHALATION)
Status: COMPLETED | OUTPATIENT
Start: 2019-04-16 | End: 2019-04-16

## 2019-04-16 RX ORDER — PREDNISONE 20 MG/1
60 TABLET ORAL
Status: COMPLETED | OUTPATIENT
Start: 2019-04-16 | End: 2019-04-16

## 2019-04-16 RX ORDER — DOXYCYCLINE 100 MG/1
100 CAPSULE ORAL 2 TIMES DAILY
Qty: 20 CAPSULE | Refills: 0 | Status: SHIPPED | OUTPATIENT
Start: 2019-04-16 | End: 2019-04-26

## 2019-04-16 RX ORDER — ALBUTEROL SULFATE 90 UG/1
1-2 AEROSOL, METERED RESPIRATORY (INHALATION) EVERY 4 HOURS PRN
Qty: 1 INHALER | Refills: 1 | OUTPATIENT
Start: 2019-04-16 | End: 2020-02-05

## 2019-04-16 RX ORDER — ALBUTEROL SULFATE 0.83 MG/ML
2.5 SOLUTION RESPIRATORY (INHALATION) EVERY 6 HOURS PRN
Qty: 150 ML | Refills: 0 | Status: SHIPPED | OUTPATIENT
Start: 2019-04-16 | End: 2019-11-17 | Stop reason: SDUPTHER

## 2019-04-16 RX ADMIN — ALBUTEROL SULFATE 10 MG: 2.5 SOLUTION RESPIRATORY (INHALATION) at 03:04

## 2019-04-16 RX ADMIN — PREDNISONE 60 MG: 20 TABLET ORAL at 03:04

## 2019-04-16 RX ADMIN — IPRATROPIUM BROMIDE 1 MG: 0.5 SOLUTION RESPIRATORY (INHALATION) at 03:04

## 2019-04-16 NOTE — ED PROVIDER NOTES
Encounter Date: 4/16/2019    SCRIBE #1 NOTE: I, Vern Sanchez, am scribing for, and in the presence of,  Luís Davis MD. I have scribed the following portions of the note - Other sections scribed: HPI, ROS.       History     Chief Complaint   Patient presents with    Nasal Congestion     Nasal congestion x 3 days with cough.  C/o left knee pain with swelling since fall two weeks ago.     CC: Nasal Congestion    HPI: This 59 y.o. Male with COPD, diabetes mellitus, diverticulitis, gout, hyperlipidemia, irregular heart beat and HTN presents to the ED for an evaluation of nasal congestion and cough for the past 3 days. Pt has bilateral knee pain and admits he fell 2 weeks ago. He complies with at home meds and metformin for diabetes. Pt denies fever, chest pain or SOB.    The history is provided by the patient. No  was used.     Review of patient's allergies indicates:   Allergen Reactions    Dye Rash     Past Medical History:   Diagnosis Date    Diabetes mellitus     Diverticulitis     Gout     Hyperlipemia     Hypertension      History reviewed. No pertinent surgical history.  History reviewed. No pertinent family history.  Social History     Tobacco Use    Smoking status: Current Every Day Smoker     Packs/day: 1.00    Smokeless tobacco: Never Used   Substance Use Topics    Alcohol use: No    Drug use: No     Review of Systems   Constitutional: Negative for chills and fever.   HENT: Positive for congestion. Negative for ear pain, rhinorrhea and sore throat.    Eyes: Negative for redness.   Respiratory: Positive for cough. Negative for shortness of breath.    Cardiovascular: Negative for chest pain.   Gastrointestinal: Negative for abdominal pain, diarrhea, nausea and vomiting.   Genitourinary: Negative for dysuria and hematuria.   Musculoskeletal: Positive for arthralgias. Negative for back pain and neck pain.   Skin: Negative for rash.   Neurological: Negative for weakness,  numbness and headaches.   Hematological: Does not bruise/bleed easily.       Physical Exam     Initial Vitals [04/16/19 1436]   BP Pulse Resp Temp SpO2   136/87 93 18 98.1 °F (36.7 °C) 95 %      MAP       --         Physical Exam    Nursing note and vitals reviewed.  Constitutional: He appears well-developed and well-nourished.   HENT:   Head: Atraumatic.   Eyes: EOM are normal. Pupils are equal, round, and reactive to light.   Neck: Normal range of motion. Neck supple. No JVD present.   Cardiovascular: Normal rate, regular rhythm, normal heart sounds and intact distal pulses. Exam reveals no gallop and no friction rub.    No murmur heard.  Pulmonary/Chest: He has wheezes.   Abdominal: Soft. Bowel sounds are normal.   Musculoskeletal: Normal range of motion.   Left knee effusion without bony tenderness or instability.  No calf pain or edema. Normal pulses and sensation.  No evidence of septic arthritis.   Lymphadenopathy:     He has no cervical adenopathy.   Neurological: He is alert and oriented to person, place, and time. He has normal strength.   Skin: Skin is warm and dry.   Psychiatric: He has a normal mood and affect. Thought content normal.         ED Course   Procedures  Labs Reviewed - No data to display       Imaging Results          X-Ray Chest 1 View (Final result)  Result time 04/16/19 15:29:07    Final result by Parker Soliman MD (04/16/19 15:29:07)                 Impression:      1. Hypoventilatory exam, no acute cardiopulmonary process.      Electronically signed by: Parker Soliman MD  Date:    04/16/2019  Time:    15:29             Narrative:    EXAMINATION:  XR CHEST 1 VIEW    CLINICAL HISTORY:  Shortness of breath    TECHNIQUE:  Single frontal view of the chest was performed.    COMPARISON:  02/06/2019    FINDINGS:  The cardiomediastinal silhouette is not enlarged.  There is stable elevation of the right hemidiaphragm.  There is a probable calcified granuloma projected over the left upper  lung zone..  There is no pleural effusion.  The trachea is midline.  The lungs are symmetrically expanded bilaterally without evidence of acute parenchymal process. No large focal consolidation seen.  There is no pneumothorax.  The osseous structures are remarkable for degenerative changes..                               X-Ray Knee 1 or 2 View Left (Final result)  Result time 04/16/19 15:28:56    Final result by Donta Lainez MD (04/16/19 15:28:56)                 Impression:      No evidence of fracture or dislocation of the left knee.    Moderate suprapatellar joint effusion.      Electronically signed by: Donta Lainez MD  Date:    04/16/2019  Time:    15:28             Narrative:    EXAMINATION:  XR KNEE 1 OR 2 VIEW LEFT    CLINICAL HISTORY:  Pain in unspecified knee    TECHNIQUE:  Frontal and lateral radiographs of the left knee.    COMPARISON:  None    FINDINGS:  The bone mineralization is within normal limits.  There is a well corticated ossific fragment adjacent to the left medial femoral condyle.  This is in the region of the medial collateral ligament complex.  There is moderate suprapatellar joint effusion.  No significant joint space narrowing is identified.  No significant osteophytosis is present.  There is no evidence of acute fracture or dislocation of the left knee.                              X-Rays:   Independently Interpreted Readings:   Chest X-Ray: Normal heart size.  No infiltrates.  No acute abnormalities.                Scribe Attestation:   Scribe #1: I performed the above scribed service and the documentation accurately describes the services I performed. I attest to the accuracy of the note.    Attending Attestation:           Physician Attestation for Scribe:  Physician Attestation Statement for Scribe #1: I, Luís Davis MD, reviewed documentation, as scribed by Vern Sanchez in my presence, and it is both accurate and complete.          Patient presents with COPD exacerbation.   Initial wheezing.  After steroids and nebulizer treatments wheezing has resolved.  Lungs clear.  Chest x-ray shows no acute abnormalities.  Vital signs are stable.  The respiratory rate of 19 on my last examination. Room air oxygen saturations of 97% on my last exam.  Patient states he feels at baseline review go home.  He is a diabetic but states is well controlled and that he has had no problems steroid use in the past.  Will prescribe doxycycline, prednisone and refill albuterol for nebulizer and inhaler.  Stable for discharge. Patient denies ever having chest pain or pain with breathing.  No clinical evidence of pulmonary embolism or other cause other than his underlying COPD.  Patient has secondary complaint of a knee injury. Patient does have a knee effusion.  No clinical evidence of infection at this time.  X-ray shows effusion but no bony abnormalities.  Follow up with Orthopedics.  Placed a knee immobilizer.  Patient able to bear weight.  Currently on Suboxone.  Will not prescribe pain medication           Clinical Impression:       ICD-10-CM ICD-9-CM   1. COPD with acute exacerbation J44.1 491.21   2. SOB (shortness of breath) R06.02 786.05   3. Knee pain, acute M25.569 719.46                                Luís Davis MD  04/16/19 9923

## 2019-04-16 NOTE — ED TRIAGE NOTES
Pt arrived to ED via personal transport with c/o nasal congestion, SOB and cough x 3 days. Pt also reports left knee pain after falling a week ago. Pt able to bear weight on the extremity. He denies any chest pain. He is AAO x 4 and in no apparent distress.

## 2019-06-12 NOTE — ED NOTES
Bed: 38qTrk  Expected date:   Expected time:   Means of arrival:   Comments:  #3   8 y/o with asthma transferred from floor on 6/12 with status asthmaticus - likely secondary to allergic triggers.    Plan:  - Continuous albuterol  - No need for positive pressure at this time  - Systemic steroids x5 days  - Regular diet  - Project [employer]

## 2019-09-26 ENCOUNTER — HOSPITAL ENCOUNTER (EMERGENCY)
Facility: HOSPITAL | Age: 59
Discharge: HOME | End: 2019-09-26
Attending: EMERGENCY MEDICINE
Payer: MEDICAID

## 2019-09-26 VITALS
DIASTOLIC BLOOD PRESSURE: 85 MMHG | HEIGHT: 68 IN | TEMPERATURE: 98 F | OXYGEN SATURATION: 95 % | WEIGHT: 265 LBS | SYSTOLIC BLOOD PRESSURE: 141 MMHG | RESPIRATION RATE: 15 BRPM | HEART RATE: 81 BPM | BODY MASS INDEX: 40.16 KG/M2

## 2019-09-26 DIAGNOSIS — R07.9 CHEST PAIN: ICD-10-CM

## 2019-09-26 DIAGNOSIS — J44.1 COPD EXACERBATION: Primary | ICD-10-CM

## 2019-09-26 LAB
ALBUMIN SERPL BCP-MCNC: 3.8 G/DL (ref 3.5–5.2)
ALP SERPL-CCNC: 78 U/L (ref 55–135)
ALT SERPL W/O P-5'-P-CCNC: 22 U/L (ref 10–44)
ANION GAP SERPL CALC-SCNC: 6 MMOL/L (ref 8–16)
AST SERPL-CCNC: 16 U/L (ref 10–40)
BASOPHILS # BLD AUTO: 0.03 K/UL (ref 0–0.2)
BASOPHILS NFR BLD: 0.3 % (ref 0–1.9)
BILIRUB SERPL-MCNC: 0.3 MG/DL (ref 0.1–1)
BNP SERPL-MCNC: 58 PG/ML (ref 0–99)
BUN SERPL-MCNC: 15 MG/DL (ref 6–20)
CALCIUM SERPL-MCNC: 9.5 MG/DL (ref 8.7–10.5)
CHLORIDE SERPL-SCNC: 105 MMOL/L (ref 95–110)
CO2 SERPL-SCNC: 30 MMOL/L (ref 23–29)
CREAT SERPL-MCNC: 1 MG/DL (ref 0.5–1.4)
DIFFERENTIAL METHOD: ABNORMAL
EOSINOPHIL # BLD AUTO: 0.3 K/UL (ref 0–0.5)
EOSINOPHIL NFR BLD: 2.5 % (ref 0–8)
ERYTHROCYTE [DISTWIDTH] IN BLOOD BY AUTOMATED COUNT: 12.9 % (ref 11.5–14.5)
EST. GFR  (AFRICAN AMERICAN): >60 ML/MIN/1.73 M^2
EST. GFR  (NON AFRICAN AMERICAN): >60 ML/MIN/1.73 M^2
GLUCOSE SERPL-MCNC: 109 MG/DL (ref 70–110)
HCT VFR BLD AUTO: 45.9 % (ref 40–54)
HGB BLD-MCNC: 14.4 G/DL (ref 14–18)
LYMPHOCYTES # BLD AUTO: 2.9 K/UL (ref 1–4.8)
LYMPHOCYTES NFR BLD: 26.3 % (ref 18–48)
MCH RBC QN AUTO: 29.1 PG (ref 27–31)
MCHC RBC AUTO-ENTMCNC: 31.4 G/DL (ref 32–36)
MCV RBC AUTO: 93 FL (ref 82–98)
MONOCYTES # BLD AUTO: 1.1 K/UL (ref 0.3–1)
MONOCYTES NFR BLD: 9.6 % (ref 4–15)
NEUTROPHILS # BLD AUTO: 6.8 K/UL (ref 1.8–7.7)
NEUTROPHILS NFR BLD: 61.5 % (ref 38–73)
PLATELET # BLD AUTO: 254 K/UL (ref 150–350)
PMV BLD AUTO: 10.4 FL (ref 9.2–12.9)
POTASSIUM SERPL-SCNC: 4.1 MMOL/L (ref 3.5–5.1)
PROT SERPL-MCNC: 7.6 G/DL (ref 6–8.4)
RBC # BLD AUTO: 4.95 M/UL (ref 4.6–6.2)
SODIUM SERPL-SCNC: 141 MMOL/L (ref 136–145)
TROPONIN I SERPL DL<=0.01 NG/ML-MCNC: 0.01 NG/ML (ref 0–0.03)
WBC # BLD AUTO: 11.08 K/UL (ref 3.9–12.7)

## 2019-09-26 PROCEDURE — 83880 ASSAY OF NATRIURETIC PEPTIDE: CPT

## 2019-09-26 PROCEDURE — 94640 AIRWAY INHALATION TREATMENT: CPT

## 2019-09-26 PROCEDURE — 84484 ASSAY OF TROPONIN QUANT: CPT

## 2019-09-26 PROCEDURE — 25000242 PHARM REV CODE 250 ALT 637 W/ HCPCS: Performed by: EMERGENCY MEDICINE

## 2019-09-26 PROCEDURE — 63600175 PHARM REV CODE 636 W HCPCS: Performed by: EMERGENCY MEDICINE

## 2019-09-26 PROCEDURE — 93005 ELECTROCARDIOGRAM TRACING: CPT

## 2019-09-26 PROCEDURE — 94644 CONT INHLJ TX 1ST HOUR: CPT

## 2019-09-26 PROCEDURE — 96374 THER/PROPH/DIAG INJ IV PUSH: CPT

## 2019-09-26 PROCEDURE — 99285 EMERGENCY DEPT VISIT HI MDM: CPT | Mod: 25

## 2019-09-26 PROCEDURE — 93010 EKG 12-LEAD: ICD-10-PCS | Mod: ,,, | Performed by: INTERNAL MEDICINE

## 2019-09-26 PROCEDURE — 80053 COMPREHEN METABOLIC PANEL: CPT

## 2019-09-26 PROCEDURE — 93010 ELECTROCARDIOGRAM REPORT: CPT | Mod: ,,, | Performed by: INTERNAL MEDICINE

## 2019-09-26 PROCEDURE — 85025 COMPLETE CBC W/AUTO DIFF WBC: CPT

## 2019-09-26 RX ORDER — ALBUTEROL SULFATE 2.5 MG/.5ML
10 SOLUTION RESPIRATORY (INHALATION)
Status: COMPLETED | OUTPATIENT
Start: 2019-09-26 | End: 2019-09-26

## 2019-09-26 RX ORDER — METHYLPREDNISOLONE SOD SUCC 125 MG
80 VIAL (EA) INJECTION
Status: COMPLETED | OUTPATIENT
Start: 2019-09-26 | End: 2019-09-26

## 2019-09-26 RX ORDER — PENICILLIN V POTASSIUM 500 MG/1
500 TABLET, FILM COATED ORAL 4 TIMES DAILY
Qty: 20 TABLET | Refills: 0 | Status: SHIPPED | OUTPATIENT
Start: 2019-09-26 | End: 2019-10-01

## 2019-09-26 RX ORDER — ALBUTEROL SULFATE 90 UG/1
2 AEROSOL, METERED RESPIRATORY (INHALATION) EVERY 6 HOURS PRN
Qty: 1 INHALER | Refills: 0 | Status: SHIPPED | OUTPATIENT
Start: 2019-09-26 | End: 2019-11-17 | Stop reason: SDUPTHER

## 2019-09-26 RX ORDER — ALBUTEROL SULFATE 2.5 MG/.5ML
2.5 SOLUTION RESPIRATORY (INHALATION) EVERY 4 HOURS PRN
Qty: 30 EACH | Refills: 0 | Status: SHIPPED | OUTPATIENT
Start: 2019-09-26 | End: 2020-09-25

## 2019-09-26 RX ORDER — PREDNISONE 20 MG/1
40 TABLET ORAL DAILY
Qty: 8 TABLET | Refills: 0 | Status: SHIPPED | OUTPATIENT
Start: 2019-09-26 | End: 2019-09-30

## 2019-09-26 RX ORDER — IPRATROPIUM BROMIDE 0.5 MG/2.5ML
1 SOLUTION RESPIRATORY (INHALATION) ONCE
Status: COMPLETED | OUTPATIENT
Start: 2019-09-26 | End: 2019-09-26

## 2019-09-26 RX ADMIN — ALBUTEROL SULFATE 10 MG: 2.5 SOLUTION RESPIRATORY (INHALATION) at 04:09

## 2019-09-26 RX ADMIN — IPRATROPIUM BROMIDE 1 MG: 0.5 SOLUTION RESPIRATORY (INHALATION) at 04:09

## 2019-09-26 RX ADMIN — METHYLPREDNISOLONE SODIUM SUCCINATE 80 MG: 125 INJECTION, POWDER, FOR SOLUTION INTRAMUSCULAR; INTRAVENOUS at 04:09

## 2019-09-26 NOTE — ED TRIAGE NOTES
"Reports SOB and generalized weakness noted by home health nurse. Reports having colonoscopy yesterday with polyp removal. Reports dry cough, states that he has run out of home breathing tx's and has also not had his "breathing machine," at night for "over a year." Also reports intermittent chest pain without any precipitating or alleviating factors.  "

## 2019-09-26 NOTE — ED PROVIDER NOTES
"Encounter Date: 9/26/2019    SCRIBE #1 NOTE: I, Murphy King, am scribing for, and in the presence of,  Richard Mchugh MD. I have scribed the following portions of the note - Other sections scribed: HPI, ROS, PE.       History     Chief Complaint   Patient presents with    Shortness of Breath     pt states that home health nurse was at his house today and she called the MD and the MD told him to come to the ED for SOB that started this AM; pt also c/o (L) and (R) sided CP that does not radiate and it started this AM; pt had colonoscpoy this AM; pt sattes he is out of his breathing tx medication    Chest Pain     CC: Shortness of Breath    HPI:  This 59 y.o male with medical h/o HTN, DM, HLD, diverticulitis presents to the ED following MD advisory for emergent evaluation of sudden onset, SOB with associated generalized weakness, diffused nonradiating CP since this morning. Pt reports colonoscopy w/ polyp removal yesterday that went well. He c/o difficulty sleeping at night recently and sometimes wakes up at midnight having nausea but no vomiting. States he has not used his "breathing machine" for over a year and that he ran out of breathing tx at home. He also c/o L knee pain lately and states he plans to have total L knee replacement. Has been going to physical therapy for it but without improvement. Has been taking Suboxone for it with minimal relief. Current cigarette smoker.       The history is provided by the patient and medical records. No  was used.     Review of patient's allergies indicates:   Allergen Reactions    Dye Rash     Past Medical History:   Diagnosis Date    Diabetes mellitus     Diverticulitis     Gout     Hyperlipemia     Hypertension      History reviewed. No pertinent surgical history.  History reviewed. No pertinent family history.  Social History     Tobacco Use    Smoking status: Current Every Day Smoker     Packs/day: 1.00    Smokeless tobacco: Never " Used   Substance Use Topics    Alcohol use: No    Drug use: No     Review of Systems   Constitutional: Negative for chills and fever.   HENT: Negative for ear pain, rhinorrhea and sore throat.    Eyes: Negative for visual disturbance.   Respiratory: Positive for shortness of breath. Negative for cough.    Cardiovascular: Positive for chest pain.   Gastrointestinal: Positive for nausea. Negative for abdominal pain and vomiting.   Genitourinary: Negative for dysuria, frequency and hematuria.   Musculoskeletal: Positive for arthralgias (L knee). Negative for back pain and neck pain.   Skin: Negative for rash.   Neurological: Positive for weakness (generalized). Negative for syncope.       Physical Exam     Initial Vitals [09/26/19 1537]   BP Pulse Resp Temp SpO2   (!) 174/85 93 20 97.9 °F (36.6 °C) 95 %      MAP       --         Physical Exam    Nursing note and vitals reviewed.  Constitutional: He appears well-developed and well-nourished. He is not diaphoretic. No distress.   HENT:   Head: Normocephalic and atraumatic.   Nose: Nose normal.   Mouth/Throat: Oropharynx is clear and moist.   Eyes: Conjunctivae and EOM are normal. Pupils are equal, round, and reactive to light. No scleral icterus.   Neck: Normal range of motion. Neck supple.   Cardiovascular: Normal rate, regular rhythm and normal heart sounds.   No murmur heard.  Pulmonary/Chest: No respiratory distress. He has wheezes (diffusely). He has no rhonchi. He has no rales.   Abdominal: Soft. Bowel sounds are normal. He exhibits no distension. There is no tenderness. There is no rebound and no guarding.   Musculoskeletal: He exhibits tenderness. He exhibits no edema.   L knee tenderness. Pain with ROM of L knee on exam.   Neurological: He is alert and oriented to person, place, and time. No cranial nerve deficit.   Skin: Skin is warm and dry. No rash noted.         ED Course   Procedures  Labs Reviewed   CBC W/ AUTO DIFFERENTIAL - Abnormal; Notable for the  following components:       Result Value    Mean Corpuscular Hemoglobin Conc 31.4 (*)     Mono # 1.1 (*)     All other components within normal limits   COMPREHENSIVE METABOLIC PANEL - Abnormal; Notable for the following components:    CO2 30 (*)     Anion Gap 6 (*)     All other components within normal limits   TROPONIN I   B-TYPE NATRIURETIC PEPTIDE        ECG Results          EKG 12-lead (In process)  Result time 09/27/19 06:40:23    In process by Interface, Lab In [facility] (09/27/19 06:40:23)                 Narrative:    Test Reason : R07.9,    Vent. Rate : 087 BPM     Atrial Rate : 087 BPM     P-R Int : 156 ms          QRS Dur : 088 ms      QT Int : 372 ms       P-R-T Axes : 037 073 055 degrees     QTc Int : 447 ms    Normal sinus rhythm  Normal ECG  No previous ECGs available    Referred By: [email or web address]   SELF           Confirmed By:                   In process by Interface, Lab In [facility] (09/27/19 06:29:45)                 Narrative:    Test Reason : R07.9,    Vent. Rate : 087 BPM     Atrial Rate : 087 BPM     P-R Int : 156 ms          QRS Dur : 088 ms      QT Int : 372 ms       P-R-T Axes : 037 073 055 degrees     QTc Int : 447 ms    Normal sinus rhythm  Normal ECG      Referred By: AAAREFERR   SELF           Confirmed By:                             Imaging Results          X-Ray Chest PA And Lateral (Final result)  Result time 09/26/19 17:19:48    Final result by Trent Wesley MD (09/26/19 17:19:48)                 Impression:      No acute or active disease.      Electronically signed by: Trent Wesley  Date:    09/26/2019  Time:    17:19             Narrative:    EXAMINATION:  XR CHEST PA AND LATERAL    CLINICAL HISTORY:  Chest Pain;    TECHNIQUE:  PA and lateral views of the chest were performed.    COMPARISON:  04/16/2019 and 01/31/2019 chest films    FINDINGS:  Single frontal and single lateral view presented.    Trachea appears normal.  Hilar shadows and mediastinal contours appear normal.   Heart is normal size for this modest inspiratory exam.  No pneumothorax or pleural effusion or interstitial edema or lobar consolidation.  There is a 5 mm calcified granuloma the left upper lung unchanged, incidental note.  Visualized spine appears intact.  There appears to be a coronary stent is seen on lateral view.                                         MDM:    59 y.o.male with PMHx as noted above, presents with SOB x 2 days. Physical exam remarkable for well appearing male, abdomen soft, nt/nd, mild wheezing noted throughout.  ED workup remarkable for EKG - nsr, no ischemic changes compared to prior, no STEMI, trop - 0.010, BNP - 58, CBC/CMP - wnl, CXR - unremarkable. Pt presentation consistent with mild COPD exacerabtion 2/2 medication non-compliance.  At this time given patient's history, physical exam, and ED workup do not suspect arrhythmia, CHF exacerbation, cardiac tamponade, MI/ACS, PE, PTX, aortic dissection, pericarditis, PNA, shingles, or any further malignant cause.  Pt treated with solumedrol, duoneb with improvement in sxms. Discussed diagnosis and further treatment with patient, return precautions given and all questions answered.  Patient in understanding of plan.  Pt discharged to home improved and stable.                Clinical Impression:       ICD-10-CM ICD-9-CM   1. COPD exacerbation J44.1 491.21   2. Chest pain R07.9 786.50       Scribe attestation: I, Richard Mchugh M.D., personally performed the services described in this documentation. All medical record entries made by the scribe were at my direction and in my presence.  I have reviewed the chart and agree that the record reflects my personal performance and is accurate and complete.                         Richard Mchugh MD  09/27/19 0242

## 2019-11-17 ENCOUNTER — HOSPITAL ENCOUNTER (EMERGENCY)
Facility: HOSPITAL | Age: 59
Discharge: HOME | End: 2019-11-17
Attending: EMERGENCY MEDICINE
Payer: MEDICAID

## 2019-11-17 VITALS
BODY MASS INDEX: 40.01 KG/M2 | DIASTOLIC BLOOD PRESSURE: 66 MMHG | SYSTOLIC BLOOD PRESSURE: 134 MMHG | HEART RATE: 67 BPM | RESPIRATION RATE: 20 BRPM | HEIGHT: 68 IN | OXYGEN SATURATION: 96 % | WEIGHT: 264 LBS | TEMPERATURE: 98 F

## 2019-11-17 DIAGNOSIS — R05.9 COUGH: ICD-10-CM

## 2019-11-17 DIAGNOSIS — J44.1 COPD EXACERBATION: Primary | ICD-10-CM

## 2019-11-17 LAB
CTP QC/QA: YES
POC MOLECULAR INFLUENZA A AGN: NEGATIVE
POC MOLECULAR INFLUENZA B AGN: NEGATIVE

## 2019-11-17 PROCEDURE — 94640 AIRWAY INHALATION TREATMENT: CPT

## 2019-11-17 PROCEDURE — 99283 EMERGENCY DEPT VISIT LOW MDM: CPT | Mod: 25

## 2019-11-17 PROCEDURE — 63600175 PHARM REV CODE 636 W HCPCS: Performed by: EMERGENCY MEDICINE

## 2019-11-17 PROCEDURE — 25000003 PHARM REV CODE 250: Performed by: EMERGENCY MEDICINE

## 2019-11-17 PROCEDURE — 25000242 PHARM REV CODE 250 ALT 637 W/ HCPCS: Performed by: EMERGENCY MEDICINE

## 2019-11-17 PROCEDURE — 87502 INFLUENZA DNA AMP PROBE: CPT

## 2019-11-17 RX ORDER — ALBUTEROL SULFATE 0.83 MG/ML
2.5 SOLUTION RESPIRATORY (INHALATION) EVERY 6 HOURS PRN
Qty: 150 ML | Refills: 0 | OUTPATIENT
Start: 2019-11-17 | End: 2020-02-05

## 2019-11-17 RX ORDER — DOXYCYCLINE 100 MG/1
100 CAPSULE ORAL 2 TIMES DAILY
Qty: 20 CAPSULE | Refills: 0 | Status: SHIPPED | OUTPATIENT
Start: 2019-11-17 | End: 2019-11-27

## 2019-11-17 RX ORDER — PREDNISONE 20 MG/1
40 TABLET ORAL DAILY
Qty: 10 TABLET | Refills: 0 | Status: SHIPPED | OUTPATIENT
Start: 2019-11-17 | End: 2019-11-22

## 2019-11-17 RX ORDER — PREDNISONE 20 MG/1
60 TABLET ORAL
Status: COMPLETED | OUTPATIENT
Start: 2019-11-17 | End: 2019-11-17

## 2019-11-17 RX ORDER — IPRATROPIUM BROMIDE AND ALBUTEROL SULFATE 2.5; .5 MG/3ML; MG/3ML
3 SOLUTION RESPIRATORY (INHALATION)
Status: COMPLETED | OUTPATIENT
Start: 2019-11-17 | End: 2019-11-17

## 2019-11-17 RX ORDER — DOXYCYCLINE HYCLATE 100 MG
100 TABLET ORAL
Status: COMPLETED | OUTPATIENT
Start: 2019-11-17 | End: 2019-11-17

## 2019-11-17 RX ORDER — ALBUTEROL SULFATE 90 UG/1
2 AEROSOL, METERED RESPIRATORY (INHALATION) EVERY 6 HOURS PRN
Qty: 1 INHALER | Refills: 0 | Status: SHIPPED | OUTPATIENT
Start: 2019-11-17 | End: 2020-02-05 | Stop reason: SDUPTHER

## 2019-11-17 RX ADMIN — DOXYCYCLINE HYCLATE 100 MG: 100 TABLET, COATED ORAL at 09:11

## 2019-11-17 RX ADMIN — IPRATROPIUM BROMIDE AND ALBUTEROL SULFATE 3 ML: .5; 3 SOLUTION RESPIRATORY (INHALATION) at 09:11

## 2019-11-17 RX ADMIN — PREDNISONE 60 MG: 20 TABLET ORAL at 09:11

## 2019-11-18 NOTE — ED PROVIDER NOTES
Encounter Date: 11/17/2019    SCRIBE #1 NOTE: I, Tonya Quach, am scribing for, and in the presence of,  Zhou Nelson MD. I have scribed the following portions of the note - Other sections scribed: HPI, ROS, PE.       History     Chief Complaint   Patient presents with    Cough     Cough x past couple days, also reports SOB      This is a 59 y.o. male with a past medical history of Diabetes mellitus, Diverticulitis, Gout, Hyperlipemia, and Hypertension who presents to the ED complaining of acute, constant, moderate, cough since 7 days ago. Patient noticed he was short of breath at work. He reports associated chronic congestion, shortness of breath, wheezing, and chills. He denies sore throat, vomiting, diarrhea, or fever. Patient notes smoking cigarettes.  Patient has history of COPD.  He states using inhaler and 500 mg of Penicillin in the past without relief. He adds coughing worsens symptoms. Cough is nonproductive.  No hemoptysis.  No chest pain. States he is wheezing more than normal. No edema. No nausea, vomiting, or diarrhea.  He has had sick contacts at home.  Two other relatives have URI symptoms.  No other associated symptoms. Nothing alleviates symptoms.     The history is provided by the patient. No  was used.     Review of patient's allergies indicates:   Allergen Reactions    Dye Rash     Past Medical History:   Diagnosis Date    Diabetes mellitus     Diverticulitis     Gout     Hyperlipemia     Hypertension      History reviewed. No pertinent surgical history.  History reviewed. No pertinent family history.  Social History     Tobacco Use    Smoking status: Current Every Day Smoker     Packs/day: 1.00    Smokeless tobacco: Never Used   Substance Use Topics    Alcohol use: No    Drug use: No     Review of Systems   Constitutional: Positive for chills and fatigue. Negative for diaphoresis and fever.   HENT: Negative for congestion, sinus pain and sore throat.     Respiratory: Positive for cough (minor mucus), shortness of breath and wheezing. Negative for stridor.    Cardiovascular: Negative for chest pain and leg swelling.   Gastrointestinal: Negative for abdominal pain, diarrhea, nausea and vomiting.   Genitourinary: Negative for dysuria, flank pain and frequency.   Musculoskeletal: Negative for back pain and joint swelling.   Skin: Negative for rash.   Neurological: Negative for dizziness, syncope, facial asymmetry, speech difficulty, weakness, numbness and headaches.   Psychiatric/Behavioral: Negative for confusion.       Physical Exam     Initial Vitals [11/17/19 2128]   BP Pulse Resp Temp SpO2   (!) 144/66 67 20 98.3 °F (36.8 °C) (!) 90 %      MAP       --         Physical Exam    Nursing note and vitals reviewed.  Constitutional: He appears well-developed and well-nourished. He is not diaphoretic. No distress.   Morbidly obese.   HENT:   Head: Normocephalic and atraumatic.   Nose: Nose normal.   Mouth/Throat: Oropharynx is clear and moist.   Eyes: EOM are normal. Right eye exhibits no discharge. Left eye exhibits no discharge. No scleral icterus.   Neck: Neck supple. No tracheal deviation present. No JVD present.   Cardiovascular: Normal rate, regular rhythm and normal heart sounds. Exam reveals no gallop and no friction rub.    No murmur heard.  Pulmonary/Chest: No stridor. No respiratory distress. He has wheezes (bilaterally). He has rhonchi (bilaterally). He has no rales.   Abdominal: Soft. Bowel sounds are normal. He exhibits no distension. There is no tenderness. There is no rebound and no guarding.   Musculoskeletal: Normal range of motion. He exhibits no edema or tenderness.   Neurological: He is alert and oriented to person, place, and time. No cranial nerve deficit.   Skin: Skin is warm and dry.   Psychiatric: He has a normal mood and affect. His behavior is normal. Judgment and thought content normal.         ED Course   Procedures  Labs Reviewed   POCT  INFLUENZA A/B MOLECULAR          Imaging Results          X-Ray Chest 1 View (Final result)  Result time 11/17/19 22:00:37    Final result by Adali Herrera MD (11/17/19 22:00:37)                 Impression:      No acute intrathoracic abnormality.      Electronically signed by: Adali Herrera  Date:    11/17/2019  Time:    22:00             Narrative:    EXAMINATION:  CHEST ONE VIEW    CLINICAL HISTORY:  Cough    TECHNIQUE:  One view of the chest.    COMPARISON:  09/26/2019    FINDINGS:  The cardiac silhouette is within normal limits.   There is no focal consolidation, pneumothorax, or pleural effusion.  Calcified granuloma seen the upper lobe.  Moderate asymmetrical elevation of the right hemidiaphragm is present.                                 Medical Decision Making:   Initial Assessment:   Patient presents URI symptoms and wheezing.  Rhonchi and wheezing present on pulmonary exam.  Patient in no respiratory distress. Oxygen saturations 99% on room air.  Provide DuoNeb.  Will obtain chest x-ray.  Will do influenza screen.  Patient likely having a mild COPD exacerbation.  No clinical evidence CHF.  No clinical evidence of pneumonia.  Differential Diagnosis:   COPD exacerbation, bronchitis, URI, influenza, pneumonia  Clinical Tests:   Lab Tests: Ordered and Reviewed       <> Summary of Lab: Influenza screen negative.  Radiological Study: Ordered and Reviewed  ED Management:  2245:  Chest x-ray normal. Symptoms improved.  Will treat as outpatient for COPD exacerbation.            Scribe Attestation:   Scribe #1: I performed the above scribed service and the documentation accurately describes the services I performed. I attest to the accuracy of the note.                          Clinical Impression:       ICD-10-CM ICD-9-CM   1. COPD exacerbation J44.1 491.21   2. Cough R05 786.2         Disposition:   Disposition: Discharged  Condition: Stable      Scribe attestation: I, Wicho Nelson MD, personally  performed the services described in this documentation. All medical record entries made by the scribe were at my direction and in my presence.  I have reviewed the chart and agree that the record reflects my personal performance and is accurate and complete.               Zhou Nelson MD  11/17/19 2697

## 2019-11-18 NOTE — ED TRIAGE NOTES
Pt presents to ED with c/o cough that started last night with associated shortness of breath. Denies productive cough. Denies all other symptoms. Current every day smoker. Did not take any medications for his symptoms. Pt in no acute distress.

## 2020-02-05 ENCOUNTER — HOSPITAL ENCOUNTER (EMERGENCY)
Facility: HOSPITAL | Age: 60
Discharge: LEFT BEFORE DISCHARGE | End: 2020-02-05
Attending: EMERGENCY MEDICINE
Payer: MEDICAID

## 2020-02-05 VITALS
BODY MASS INDEX: 41.98 KG/M2 | OXYGEN SATURATION: 96 % | HEIGHT: 68 IN | HEART RATE: 83 BPM | DIASTOLIC BLOOD PRESSURE: 67 MMHG | TEMPERATURE: 99 F | RESPIRATION RATE: 19 BRPM | SYSTOLIC BLOOD PRESSURE: 140 MMHG | WEIGHT: 277 LBS

## 2020-02-05 DIAGNOSIS — R06.02 SHORTNESS OF BREATH: ICD-10-CM

## 2020-02-05 DIAGNOSIS — R06.02 SOB (SHORTNESS OF BREATH): ICD-10-CM

## 2020-02-05 LAB
ALBUMIN SERPL BCP-MCNC: 3.7 G/DL (ref 3.5–5.2)
ALP SERPL-CCNC: 80 U/L (ref 55–135)
ALT SERPL W/O P-5'-P-CCNC: 17 U/L (ref 10–44)
ANION GAP SERPL CALC-SCNC: 7 MMOL/L (ref 8–16)
AST SERPL-CCNC: 16 U/L (ref 10–40)
BASOPHILS # BLD AUTO: 0.06 K/UL (ref 0–0.2)
BASOPHILS NFR BLD: 0.5 % (ref 0–1.9)
BILIRUB SERPL-MCNC: 0.2 MG/DL (ref 0.1–1)
BNP SERPL-MCNC: 102 PG/ML (ref 0–99)
BUN SERPL-MCNC: 15 MG/DL (ref 6–20)
CALCIUM SERPL-MCNC: 9.3 MG/DL (ref 8.7–10.5)
CHLORIDE SERPL-SCNC: 104 MMOL/L (ref 95–110)
CO2 SERPL-SCNC: 30 MMOL/L (ref 23–29)
CREAT SERPL-MCNC: 0.9 MG/DL (ref 0.5–1.4)
DIFFERENTIAL METHOD: ABNORMAL
EOSINOPHIL # BLD AUTO: 0.2 K/UL (ref 0–0.5)
EOSINOPHIL NFR BLD: 2.2 % (ref 0–8)
ERYTHROCYTE [DISTWIDTH] IN BLOOD BY AUTOMATED COUNT: 12.5 % (ref 11.5–14.5)
EST. GFR  (AFRICAN AMERICAN): >60 ML/MIN/1.73 M^2
EST. GFR  (NON AFRICAN AMERICAN): >60 ML/MIN/1.73 M^2
GLUCOSE SERPL-MCNC: 122 MG/DL (ref 70–110)
HCT VFR BLD AUTO: 50.1 % (ref 40–54)
HGB BLD-MCNC: 15.6 G/DL (ref 14–18)
IMM GRANULOCYTES # BLD AUTO: 0.02 K/UL (ref 0–0.04)
IMM GRANULOCYTES NFR BLD AUTO: 0.2 % (ref 0–0.5)
INR PPP: 1 (ref 0.8–1.2)
LYMPHOCYTES # BLD AUTO: 3.2 K/UL (ref 1–4.8)
LYMPHOCYTES NFR BLD: 28.9 % (ref 18–48)
MAGNESIUM SERPL-MCNC: 1.8 MG/DL (ref 1.6–2.6)
MCH RBC QN AUTO: 28.6 PG (ref 27–31)
MCHC RBC AUTO-ENTMCNC: 31.1 G/DL (ref 32–36)
MCV RBC AUTO: 92 FL (ref 82–98)
MONOCYTES # BLD AUTO: 1.2 K/UL (ref 0.3–1)
MONOCYTES NFR BLD: 10.6 % (ref 4–15)
NEUTROPHILS # BLD AUTO: 6.4 K/UL (ref 1.8–7.7)
NEUTROPHILS NFR BLD: 57.6 % (ref 38–73)
NRBC BLD-RTO: 0 /100 WBC
PLATELET # BLD AUTO: 215 K/UL (ref 150–350)
PMV BLD AUTO: 10.9 FL (ref 9.2–12.9)
POTASSIUM SERPL-SCNC: 4.2 MMOL/L (ref 3.5–5.1)
PROT SERPL-MCNC: 7.4 G/DL (ref 6–8.4)
PROTHROMBIN TIME: 10.7 SEC (ref 9–12.5)
RBC # BLD AUTO: 5.46 M/UL (ref 4.6–6.2)
SODIUM SERPL-SCNC: 141 MMOL/L (ref 136–145)
TROPONIN I SERPL DL<=0.01 NG/ML-MCNC: 0.01 NG/ML (ref 0–0.03)
WBC # BLD AUTO: 11.13 K/UL (ref 3.9–12.7)

## 2020-02-05 PROCEDURE — 96361 HYDRATE IV INFUSION ADD-ON: CPT

## 2020-02-05 PROCEDURE — 25000242 PHARM REV CODE 250 ALT 637 W/ HCPCS: Performed by: EMERGENCY MEDICINE

## 2020-02-05 PROCEDURE — 85610 PROTHROMBIN TIME: CPT

## 2020-02-05 PROCEDURE — 85025 COMPLETE CBC W/AUTO DIFF WBC: CPT

## 2020-02-05 PROCEDURE — 80053 COMPREHEN METABOLIC PANEL: CPT

## 2020-02-05 PROCEDURE — 93010 ELECTROCARDIOGRAM REPORT: CPT | Mod: ,,, | Performed by: INTERNAL MEDICINE

## 2020-02-05 PROCEDURE — 93005 ELECTROCARDIOGRAM TRACING: CPT

## 2020-02-05 PROCEDURE — 96360 HYDRATION IV INFUSION INIT: CPT

## 2020-02-05 PROCEDURE — 83880 ASSAY OF NATRIURETIC PEPTIDE: CPT

## 2020-02-05 PROCEDURE — 63600175 PHARM REV CODE 636 W HCPCS: Performed by: EMERGENCY MEDICINE

## 2020-02-05 PROCEDURE — 99285 EMERGENCY DEPT VISIT HI MDM: CPT | Mod: 25

## 2020-02-05 PROCEDURE — 94640 AIRWAY INHALATION TREATMENT: CPT

## 2020-02-05 PROCEDURE — 93010 EKG 12-LEAD: ICD-10-PCS | Mod: ,,, | Performed by: INTERNAL MEDICINE

## 2020-02-05 PROCEDURE — 83735 ASSAY OF MAGNESIUM: CPT

## 2020-02-05 PROCEDURE — 84484 ASSAY OF TROPONIN QUANT: CPT

## 2020-02-05 RX ORDER — ALBUTEROL SULFATE 90 UG/1
2 AEROSOL, METERED RESPIRATORY (INHALATION) EVERY 6 HOURS PRN
Qty: 8 G | Refills: 0 | Status: SHIPPED | OUTPATIENT
Start: 2020-02-05

## 2020-02-05 RX ORDER — SODIUM CHLORIDE 9 MG/ML
1000 INJECTION, SOLUTION INTRAVENOUS
Status: COMPLETED | OUTPATIENT
Start: 2020-02-05 | End: 2020-02-05

## 2020-02-05 RX ORDER — PREDNISONE 20 MG/1
60 TABLET ORAL
Status: COMPLETED | OUTPATIENT
Start: 2020-02-05 | End: 2020-02-05

## 2020-02-05 RX ORDER — IPRATROPIUM BROMIDE AND ALBUTEROL SULFATE 2.5; .5 MG/3ML; MG/3ML
3 SOLUTION RESPIRATORY (INHALATION) EVERY 4 HOURS PRN
Qty: 1 BOX | Refills: 0 | Status: SHIPPED | OUTPATIENT
Start: 2020-02-05

## 2020-02-05 RX ORDER — PREDNISONE 20 MG/1
40 TABLET ORAL DAILY
Qty: 8 TABLET | Refills: 0 | Status: SHIPPED | OUTPATIENT
Start: 2020-02-06 | End: 2020-02-10

## 2020-02-05 RX ORDER — IPRATROPIUM BROMIDE AND ALBUTEROL SULFATE 2.5; .5 MG/3ML; MG/3ML
3 SOLUTION RESPIRATORY (INHALATION)
Status: COMPLETED | OUTPATIENT
Start: 2020-02-05 | End: 2020-02-05

## 2020-02-05 RX ADMIN — IPRATROPIUM BROMIDE AND ALBUTEROL SULFATE 3 ML: .5; 3 SOLUTION RESPIRATORY (INHALATION) at 01:02

## 2020-02-05 RX ADMIN — SODIUM CHLORIDE 1000 ML: 0.9 INJECTION, SOLUTION INTRAVENOUS at 01:02

## 2020-02-05 RX ADMIN — PREDNISONE 60 MG: 20 TABLET ORAL at 03:02

## 2020-02-05 NOTE — ED PROVIDER NOTES
"Encounter Date: 2/5/2020    SCRIBE #1 NOTE: I, Rosa Maria Valentino, am scribing for, and in the presence of,  Sanjuanita De Leon MD. I have scribed the following portions of the note - Other sections scribed: HPI, ROS, PE.       History     Chief Complaint   Patient presents with    Spasms     pt states he suffers with chronic muscle spasms under his (R) pectoral muscle    Shortness of Breath     hx of COPD; says he has been out of his albuterol x 1 week; c/o SOB     CC: Shortness of breath    HPI:  This is a 60 y.o. male who presents to the Emergency Department with a cc of shortness of breath that began one week ago. The patient reports also complains of upper abdominal spasms, chest pain, joint swelling, leg swelling, and diaphoresis. Denies fever, nausea, or vomiting. Patient states that he ran out of breathing treatments one week ago. No medications attempted prior to arrival. There are no alleviating factors to his symptoms.     The history is provided by the patient.     Review of patient's allergies indicates:   Allergen Reactions    Dye Rash     Past Medical History:   Diagnosis Date    Diabetes mellitus     Diverticulitis     Gout     Hyperlipemia     Hypertension      No past surgical history on file.  No family history on file.  Social History     Tobacco Use    Smoking status: Current Every Day Smoker     Packs/day: 1.00    Smokeless tobacco: Never Used   Substance Use Topics    Alcohol use: No    Drug use: No     Review of Systems   Constitutional: Positive for diaphoresis. Negative for fever.   HENT: Negative for sore throat.    Eyes: Negative for visual disturbance.   Respiratory: Positive for shortness of breath.    Cardiovascular: Positive for chest pain and leg swelling.   Gastrointestinal: Positive for abdominal pain ("RUQ and LUQ abdominal spasms"). Negative for nausea and vomiting.   Genitourinary: Negative for dysuria.   Musculoskeletal: Positive for joint swelling.   Skin: Negative for " rash.   Neurological: Negative for headaches.   Psychiatric/Behavioral: Negative for behavioral problems.       Physical Exam     Initial Vitals [02/05/20 1312]   BP Pulse Resp Temp SpO2   137/81 86 (!) 22 98.5 °F (36.9 °C) 95 %      MAP       --         Physical Exam    Nursing note and vitals reviewed.  Constitutional: He is not diaphoretic. No distress.   HENT:   Head: Normocephalic and atraumatic.   Right Ear: External ear normal.   Left Ear: External ear normal.   Mouth/Throat: No oropharyngeal exudate.   Speaking in full sentences.    Eyes: EOM are normal. Pupils are equal, round, and reactive to light.   Neck: Normal range of motion. Neck supple.   Cardiovascular: Normal rate and regular rhythm.   Pulmonary/Chest: No respiratory distress. He has wheezes.   Scant wheezing bilaterally.   Abdominal: Soft. Bowel sounds are normal. He exhibits no distension. There is no tenderness. There is no rebound and no guarding.   Musculoskeletal: He exhibits edema. He exhibits no tenderness.   Bilateral lower extremity pitting edema.   Neurological: He is alert and oriented to person, place, and time. No cranial nerve deficit or sensory deficit. GCS score is 15. GCS eye subscore is 4. GCS verbal subscore is 5. GCS motor subscore is 6.   Skin: Skin is warm. Capillary refill takes less than 2 seconds.   Psychiatric: He has a normal mood and affect.         ED Course   Procedures  Labs Reviewed   CBC W/ AUTO DIFFERENTIAL - Abnormal; Notable for the following components:       Result Value    Mean Corpuscular Hemoglobin Conc 31.1 (*)     Mono # 1.2 (*)     All other components within normal limits   COMPREHENSIVE METABOLIC PANEL - Abnormal; Notable for the following components:    CO2 30 (*)     Glucose 122 (*)     Anion Gap 7 (*)     All other components within normal limits   B-TYPE NATRIURETIC PEPTIDE - Abnormal; Notable for the following components:     (*)     All other components within normal limits   TROPONIN I    PROTIME-INR   MAGNESIUM     EKG Readings: (Independently Interpreted)   Initial Reading: No STEMI. Rhythm: Normal Sinus Rhythm. Ectopy: Bigeminy PVCs. Conduction: 2nd Degree AV Block - Type II. ST Segments: Non-Specific ST Segment Depression. T Waves: Normal. Axis: Normal.     ECG Results          EKG 12-lead (Final result)  Result time 02/05/20 21:02:01    Final result by Interface, Lab In [facility] (02/05/20 21:02:01)                 Narrative:    Test Reason : R06.02,    Vent. Rate : 091 BPM     Atrial Rate : 136 BPM     P-R Int : 136 ms          QRS Dur : 118 ms      QT Int : 386 ms       P-R-T Axes : 041 081 -55 degrees     QTc Int : 474 ms    SR with occ PVCs  Septal infarct ,age undetermined    Abnormal ECG  When compared with ECG of 26-SEP-2019 15:35,  Significant changes have occurred  Confirmed by Daniel De Los Santos MD ([id number]) on 2/5/2020 9:01:52 PM    Referred By: [email or web address]   SELF           Confirmed By:Daniel De Los Santos MD                            Imaging Results          X-Ray Chest PA And Lateral (Final result)  Result time 02/05/20 13:50:45    Final result by Rehana Farr MD (02/05/20 13:50:45)                 Impression:      No intrathoracic disease identified.      Electronically signed by: Rehana Farr MD  Date:    02/05/2020  Time:    13:50             Narrative:    EXAMINATION:  XR CHEST PA AND LATERAL    CLINICAL HISTORY:  Shortness of breath    TECHNIQUE:  PA and lateral views of the chest were performed.    COMPARISON:  11/17/2019.    FINDINGS:  X-ray beam attenuation and scatter occur in generous overlying soft tissues.  Soft tissues of the patient's arms project over lateral view obscuring some detail of the retrosternal airspace and mediastinal margins.    Mediastinal structures are midline. Cardiac silhouette and pulmonary vascular distribution are normal.    Lung volumes are normal and symmetric. I detect no pulmonary disease, pleural fluid, lymph node enlargement, cardiac  decompensation, pneumothorax, pneumomediastinum, pneumoperitoneum or significant osseous abnormality.                                 Medical Decision Making:   History:   Old Medical Records: I decided to obtain old medical records.  Old Records Summarized: records from clinic visits.       <> Summary of Records: 11/17/2019: Treated in ED for COPD exacerbation.   Differential Diagnosis:   Includes but not limited to:  COPD exacerbation  CHF exacerbation  Electrolyte Abnormality   Pneumonia  Arthritis  Independently Interpreted Test(s):   I have ordered and independently interpreted EKG Reading(s) - see prior notes  Clinical Tests:   Lab Tests: Ordered and Reviewed  Radiological Study: Ordered and Reviewed  Medical Tests: Ordered and Reviewed    Additional MDM:   Heart Score:    History:          Slightly suspicious.  ECG:             Nonspecific repolarisation disturbance  Age:               45-65 years  Risk factors: >= 3 risk factors or history of atherosclerotic disease  Troponin:       Less than or equal to normal limit  Final Score: 4             Scribe Attestation:   Scribe #1: I performed the above scribed service and the documentation accurately describes the services I performed. I attest to the accuracy of the note.            ED Course as of Feb 05 2116   Wed Feb 05, 2020   1340 On initial assessment patient is afebrile and not toxic appearing.  He is not hypoxic, tachypneic or in respiratory distress. Will obtain lab testing and imaging to further evaluate    [SG]   1535 Labs within acceptable limits without leukocytosis, granulocytosis or renal failure.  CO2 is mildly elevated consistent patient's history of COPD.  BNP is mildly elevated at 102, unlikely to indicate CHF.  Troponin is negative. EKG with 2nd degree AV block and bigeminy.  Patient has a heart score greater than four.  Patient recommended for observation for further cardiac monitoring and testing however he reports that he must leave to  drive off his grandson.  Patient has signed out against medical advice but reports that he will return to the emergency department after dropping off his grandson. he has been given prescriptions for albuterol and prednisone in referred to his primary physician cardiologist should he not return to the emergency department.  Patient has signed out against medical advice    [SG]      ED Course User Index  [SG] Sanjuanita De Leon MD                Clinical Impression:       ICD-10-CM ICD-9-CM   1. SOB (shortness of breath) R06.02 786.05   2. Shortness of breath R06.02 786.05   3. Shortness of breath R06.02 786.05         Disposition:   Disposition: AMA  Condition: Fair    Scribe attestation: I, Sanjuanita De Leon , personally performed the services described in this documentation. All medical record entries made by the scribe were at my direction and in my presence. I have reviewed the chart and agree that the record reflects my personal performance and is accurate and complete                     Sanjuanita De Leon MD  02/05/20 0683

## 2020-02-05 NOTE — DISCHARGE INSTRUCTIONS
You are leaving against medical advice.  You should return to the emergency department as soon as possible to complete evaluation.  You have been given refills of albuterol and a prescription for steroids.  You should have further evaluation by a cardiologist.

## 2020-03-01 ENCOUNTER — HOSPITAL ENCOUNTER (EMERGENCY)
Facility: HOSPITAL | Age: 60
Discharge: HOME | End: 2020-03-01
Attending: EMERGENCY MEDICINE
Payer: MEDICAID

## 2020-03-01 VITALS
OXYGEN SATURATION: 95 % | BODY MASS INDEX: 42.28 KG/M2 | HEART RATE: 96 BPM | DIASTOLIC BLOOD PRESSURE: 94 MMHG | WEIGHT: 279 LBS | SYSTOLIC BLOOD PRESSURE: 179 MMHG | RESPIRATION RATE: 17 BRPM | HEIGHT: 68 IN | TEMPERATURE: 99 F

## 2020-03-01 DIAGNOSIS — J44.9 CHRONIC OBSTRUCTIVE PULMONARY DISEASE, UNSPECIFIED COPD TYPE: Primary | ICD-10-CM

## 2020-03-01 DIAGNOSIS — R09.02 HYPOXIA: ICD-10-CM

## 2020-03-01 DIAGNOSIS — I49.3 PREMATURE VENTRICULAR CONTRACTIONS: ICD-10-CM

## 2020-03-01 DIAGNOSIS — Z91.199 HISTORY OF NONCOMPLIANCE WITH MEDICAL TREATMENT: ICD-10-CM

## 2020-03-01 LAB
ALBUMIN SERPL BCP-MCNC: 3.8 G/DL (ref 3.5–5.2)
ALP SERPL-CCNC: 74 U/L (ref 55–135)
ALT SERPL W/O P-5'-P-CCNC: 17 U/L (ref 10–44)
ANION GAP SERPL CALC-SCNC: 9 MMOL/L (ref 8–16)
AST SERPL-CCNC: 16 U/L (ref 10–40)
BACTERIA #/AREA URNS HPF: ABNORMAL /HPF
BASOPHILS # BLD AUTO: 0.05 K/UL (ref 0–0.2)
BASOPHILS NFR BLD: 0.5 % (ref 0–1.9)
BILIRUB SERPL-MCNC: 0.2 MG/DL (ref 0.1–1)
BILIRUB UR QL STRIP: NEGATIVE
BUN SERPL-MCNC: 14 MG/DL (ref 6–20)
CALCIUM SERPL-MCNC: 9.8 MG/DL (ref 8.7–10.5)
CHLORIDE SERPL-SCNC: 106 MMOL/L (ref 95–110)
CLARITY UR: CLEAR
CO2 SERPL-SCNC: 29 MMOL/L (ref 23–29)
COLOR UR: YELLOW
CREAT SERPL-MCNC: 1 MG/DL (ref 0.5–1.4)
DIFFERENTIAL METHOD: ABNORMAL
EOSINOPHIL # BLD AUTO: 0.3 K/UL (ref 0–0.5)
EOSINOPHIL NFR BLD: 2.3 % (ref 0–8)
ERYTHROCYTE [DISTWIDTH] IN BLOOD BY AUTOMATED COUNT: 12.7 % (ref 11.5–14.5)
EST. GFR  (AFRICAN AMERICAN): >60 ML/MIN/1.73 M^2
EST. GFR  (NON AFRICAN AMERICAN): >60 ML/MIN/1.73 M^2
GLUCOSE SERPL-MCNC: 108 MG/DL (ref 70–110)
GLUCOSE UR QL STRIP: NEGATIVE
HCT VFR BLD AUTO: 49.4 % (ref 40–54)
HGB BLD-MCNC: 15.3 G/DL (ref 14–18)
HGB UR QL STRIP: NEGATIVE
HYALINE CASTS #/AREA URNS LPF: 2 /LPF
IMM GRANULOCYTES # BLD AUTO: 0.03 K/UL (ref 0–0.04)
IMM GRANULOCYTES NFR BLD AUTO: 0.3 % (ref 0–0.5)
KETONES UR QL STRIP: NEGATIVE
LEUKOCYTE ESTERASE UR QL STRIP: NEGATIVE
LYMPHOCYTES # BLD AUTO: 2.8 K/UL (ref 1–4.8)
LYMPHOCYTES NFR BLD: 26.1 % (ref 18–48)
MAGNESIUM SERPL-MCNC: 1.9 MG/DL (ref 1.6–2.6)
MCH RBC QN AUTO: 28.4 PG (ref 27–31)
MCHC RBC AUTO-ENTMCNC: 31 G/DL (ref 32–36)
MCV RBC AUTO: 92 FL (ref 82–98)
MICROSCOPIC COMMENT: ABNORMAL
MONOCYTES # BLD AUTO: 1.1 K/UL (ref 0.3–1)
MONOCYTES NFR BLD: 10.1 % (ref 4–15)
NEUTROPHILS # BLD AUTO: 6.5 K/UL (ref 1.8–7.7)
NEUTROPHILS NFR BLD: 60.7 % (ref 38–73)
NITRITE UR QL STRIP: NEGATIVE
NRBC BLD-RTO: 0 /100 WBC
PH UR STRIP: 5 [PH] (ref 5–8)
PLATELET # BLD AUTO: 202 K/UL (ref 150–350)
PMV BLD AUTO: 11.1 FL (ref 9.2–12.9)
POTASSIUM SERPL-SCNC: 3.9 MMOL/L (ref 3.5–5.1)
PROT SERPL-MCNC: 7.6 G/DL (ref 6–8.4)
PROT UR QL STRIP: ABNORMAL
RBC # BLD AUTO: 5.38 M/UL (ref 4.6–6.2)
RBC #/AREA URNS HPF: 1 /HPF (ref 0–4)
SODIUM SERPL-SCNC: 144 MMOL/L (ref 136–145)
SP GR UR STRIP: 1.02 (ref 1–1.03)
SQUAMOUS #/AREA URNS HPF: 1 /HPF
URN SPEC COLLECT METH UR: ABNORMAL
UROBILINOGEN UR STRIP-ACNC: NEGATIVE EU/DL
WBC # BLD AUTO: 10.68 K/UL (ref 3.9–12.7)
WBC #/AREA URNS HPF: 1 /HPF (ref 0–5)

## 2020-03-01 PROCEDURE — 85025 COMPLETE CBC W/AUTO DIFF WBC: CPT

## 2020-03-01 PROCEDURE — 83735 ASSAY OF MAGNESIUM: CPT

## 2020-03-01 PROCEDURE — 99283 EMERGENCY DEPT VISIT LOW MDM: CPT

## 2020-03-01 PROCEDURE — 81000 URINALYSIS NONAUTO W/SCOPE: CPT

## 2020-03-01 PROCEDURE — 80053 COMPREHEN METABOLIC PANEL: CPT

## 2020-03-01 RX ORDER — CLONIDINE HYDROCHLORIDE 0.1 MG/1
0.1 TABLET ORAL
Status: DISCONTINUED | OUTPATIENT
Start: 2020-03-01 | End: 2020-03-01

## 2020-03-01 RX ORDER — HYDRALAZINE HYDROCHLORIDE 20 MG/ML
20 INJECTION INTRAMUSCULAR; INTRAVENOUS
Status: DISCONTINUED | OUTPATIENT
Start: 2020-03-01 | End: 2020-03-01

## 2020-03-01 NOTE — ED PROVIDER NOTES
Encounter Date: 3/1/2020    SCRIBE #1 NOTE: I, Karo Cuba, am scribing for, and in the presence of,  Randal Hsieh MD. I have scribed the following portions of the note - Other sections scribed: HPI, ROS.       History     Chief Complaint   Patient presents with    Bradycardia     pt says his home health nurse came by today and took his VS and his HR was lower than normal, BP was high, and O2 level was lower than normal for his COPD; also c/o being diaphoretic and pale in color    Hypoxia     CC: Bradycardia    HPI: This is a 60 y.o. male with a PMHx of diabetes mellitus, hypertension, hyperlipidemia, and gout who presents to the Emergency Department with a cc of bradycardia beginning today. Patient states he was evaluated by his home health nurse today who noticed a heart rate of 56 bpm prompting him to come to the ED. He reports associated fatigue, diaphoresis, pale color, and leg swelling. He also notes right inguinal/ right lower quadrant pain that has been present for two months but is now worsening since a fall two days ago. Patient denies fever, chills, ear pain, sore throat, chest pain, cough, shortness of breath. No worsening or alleviating factors are noted and no treatments have been tried. Patient reports no prior history of similar symptoms. He is a current smoker but does not drink or use drugs. Patient takes Lisinopril, Suboxone, and Metformin and has no known drug allergies.    The history is provided by the patient. No  was used.     Review of patient's allergies indicates:   Allergen Reactions    Dye Rash     Past Medical History:   Diagnosis Date    COPD (chronic obstructive pulmonary disease)     Diabetes mellitus     Diverticulitis     Gout     Hyperlipemia     Hypertension      History reviewed. No pertinent surgical history.  History reviewed. No pertinent family history.  Social History     Tobacco Use    Smoking status: Current Every Day Smoker      Packs/day: 1.00    Smokeless tobacco: Never Used   Substance Use Topics    Alcohol use: No    Drug use: No     Review of Systems   Constitutional: Positive for diaphoresis and fatigue. Negative for chills and fever.   HENT: Negative for ear pain and sore throat.    Eyes: Negative for pain.   Respiratory: Negative for cough and shortness of breath.    Cardiovascular: Positive for leg swelling. Negative for chest pain.   Gastrointestinal: Positive for abdominal pain. Negative for diarrhea, nausea and vomiting.   Genitourinary: Negative for dysuria.   Musculoskeletal: Positive for arthralgias (chronic ankle pain). Negative for back pain, joint swelling and myalgias.   Skin: Positive for color change (pale). Negative for rash.   Neurological: Negative for headaches.       Physical Exam     Initial Vitals [03/01/20 1428]   BP Pulse Resp Temp SpO2   (!) 191/96 (!) 48 (!) 24 98.8 °F (37.1 °C) (!) 88 %      MAP       --         Physical Exam    The patient was examined specifically for the following:   General:No significant distress, Good color, Warm and dry. Head and neck:Scalp atraumatic, Neck supple. Neurological:Appropriate conversation, Gross motor deficits. Eyes:Conjugate gaze, Clear corneas. ENT: No epistaxis. Cardiac: Regular rate and rhythm, Grossly normal heart tones. Pulmonary: Wheezing, Rales. Gastrointestinal: Abdominal tenderness, Abdominal distention. Musculoskeletal: Extremity deformity, Apparent pain with range of motion of the joints. Skin: Rash.   The findings on examination were normal except for the following:   The patient is morbidly obese.  There is some minimal tenderness in the right lower quadrant right inguinal region I do not feel hernia or mass.  There are no abrasions or contusions on the abdominal wall.  The patient has normal male genitalia.  The lungs are clear and free of wheezing rales rubs or rhonchi.  The patient has an irregular heart rate.  Patient's heart rate is 102 in the  emergency room.  Oxygen saturations are 88%.   His blood pressure is 191 over 96.   There is no clinical evidence of respiratory distress.  Lower extremities are nontender and they are not swollen.  I see no significant pedal edema.  His blood pressure is 191/96.  He is not taking his lisinopril for more than a week.  He is taking his Suboxone.  ED Course   Procedures  Labs Reviewed   CBC W/ AUTO DIFFERENTIAL - Abnormal; Notable for the following components:       Result Value    Mean Corpuscular Hemoglobin Conc 31.0 (*)     Mono # 1.1 (*)     All other components within normal limits   URINALYSIS, REFLEX TO URINE CULTURE - Abnormal; Notable for the following components:    Protein, UA 1+ (*)     All other components within normal limits    Narrative:     Preferred Collection Type->Urine, Clean Catch   URINALYSIS MICROSCOPIC - Abnormal; Notable for the following components:    Hyaline Casts, UA 2 (*)     All other components within normal limits    Narrative:     Preferred Collection Type->Urine, Clean Catch   COMPREHENSIVE METABOLIC PANEL   MAGNESIUM     EKG Readings: (Independently Interpreted)     This patient is in a sinus rhythm with frequent premature ventricular contractions.  There are no significant ST segment changes.  There is T-wave flattening.  There is no evidence of acute myocardial infarction or malignant arrhythmia.       Imaging Results          X-Ray Chest 1 View (In process)                 Medical decision making: Given the above this patient appears to be in a sinus rhythm with a heart rate of about 98 on initial evaluation.  He has frequent PVCs.  He has  ventricular trigeminy.  He was observed on the monitor for hours and his heart rate remained around 93. His blood pressure fluctuated from 167/100 to   191/96.   This patient reports that he has 8 or 9 medicines.  He does not take them because he does not feel right when he takes them.   He is followed at [facility].  He also has episodes  where his oxygen saturation reads 88%.  At other times it reads 94 95%.  The patient has no significant respiratory distress.  I doubt significant hypoxia or hypercarbia.  The patient has remained alert bright the entire time that he has in the emergency room.  I will discharge him to follow up with primary care.  I will encourage him to take his medicines.  We discussed his medicines.  The patient is not taking them.  He says that he has 8 or 9 medicines that he is supposed take but he does not feel right when he takes them.   I encouraged him to take his medicines as directed.  The patient showed no signs of significant distress during his evaluation in the emergency room.   I will discharge him to follow up with primary care.                Scribe Attestation:   Scribe #1: I performed the above scribed service and the documentation accurately describes the services I performed. I attest to the accuracy of the note.                          Clinical Impression:       ICD-10-CM ICD-9-CM   1. Chronic obstructive pulmonary disease, unspecified COPD type J44.9 496   2. Hypoxia R09.02 799.02   3. History of noncompliance with medical treatment Z91.19 V15.81   4. Premature ventricular contractions I49.3 427.69             ED Disposition Condition    Discharge Stable        ED Prescriptions     None        Follow-up Information    None                       I personally performed the services described in this documentation.  All medical record  entries made by the scribe are at my direction and in my presence.  Signed, Dr. Мария Hsieh MD  03/01/20 [id number]

## 2020-03-01 NOTE — DISCHARGE INSTRUCTIONS
Please take  your medicines as directed.  Please return if you get worse or if new problems develop.  Please follow-up with your primary care doctor tomorrow.  Rest.

## 2020-03-01 NOTE — ED TRIAGE NOTES
Pt presents to ED with c/o bradycardia and hypoxia. Per pt, home health nurse took VS and stated pt's heart rate O2 level was below pt's normal. Per pt, heart rate 56; O2 level unknown. Pt room air baseline. Pt reports feeling lethargic. Pt denies CP or SOB. Pt A&O x4.

## 2021-04-14 ENCOUNTER — HOSPITAL ENCOUNTER (EMERGENCY)
Facility: HOSPITAL | Age: 61
Discharge: HOME | End: 2021-04-14
Attending: EMERGENCY MEDICINE
Payer: MEDICAID

## 2021-04-14 VITALS
HEART RATE: 102 BPM | RESPIRATION RATE: 18 BRPM | HEIGHT: 67 IN | BODY MASS INDEX: 46.15 KG/M2 | SYSTOLIC BLOOD PRESSURE: 134 MMHG | WEIGHT: 294 LBS | TEMPERATURE: 98 F | DIASTOLIC BLOOD PRESSURE: 83 MMHG | OXYGEN SATURATION: 96 %

## 2021-04-14 DIAGNOSIS — M25.532 LEFT WRIST PAIN: Primary | ICD-10-CM

## 2021-04-14 LAB — POCT GLUCOSE: 370 MG/DL (ref 70–110)

## 2021-04-14 PROCEDURE — 82962 GLUCOSE BLOOD TEST: CPT

## 2021-04-14 PROCEDURE — 99284 EMERGENCY DEPT VISIT MOD MDM: CPT | Mod: 25

## 2021-04-14 PROCEDURE — 63600175 PHARM REV CODE 636 W HCPCS: Performed by: PHYSICIAN ASSISTANT

## 2021-04-14 PROCEDURE — 29125 APPL SHORT ARM SPLINT STATIC: CPT | Mod: LT

## 2021-04-14 PROCEDURE — 96372 THER/PROPH/DIAG INJ SC/IM: CPT

## 2021-04-14 RX ORDER — ALLOPURINOL 100 MG/1
100 TABLET ORAL DAILY
COMMUNITY
Start: 2021-02-22

## 2021-04-14 RX ORDER — MELOXICAM 15 MG/1
15 TABLET ORAL DAILY
Qty: 30 TABLET | Refills: 0 | Status: SHIPPED | OUTPATIENT
Start: 2021-04-14

## 2021-04-14 RX ORDER — KETOROLAC TROMETHAMINE 30 MG/ML
30 INJECTION, SOLUTION INTRAMUSCULAR; INTRAVENOUS
Status: COMPLETED | OUTPATIENT
Start: 2021-04-14 | End: 2021-04-14

## 2021-04-14 RX ORDER — HYDROXYZINE HYDROCHLORIDE 25 MG/1
25 TABLET, FILM COATED ORAL NIGHTLY
COMMUNITY
Start: 2021-02-22

## 2021-04-14 RX ORDER — TAMSULOSIN HYDROCHLORIDE 0.4 MG/1
1 CAPSULE ORAL DAILY
COMMUNITY
Start: 2021-02-22

## 2021-04-14 RX ADMIN — KETOROLAC TROMETHAMINE 30 MG: 30 INJECTION, SOLUTION INTRAMUSCULAR at 11:04

## 2021-05-04 ENCOUNTER — HOSPITAL ENCOUNTER (EMERGENCY)
Facility: HOSPITAL | Age: 61
Discharge: HOME | End: 2021-05-05
Attending: EMERGENCY MEDICINE
Payer: MEDICAID

## 2021-05-04 DIAGNOSIS — R73.9 HYPERGLYCEMIA: ICD-10-CM

## 2021-05-04 DIAGNOSIS — E86.0 DEHYDRATION: Primary | ICD-10-CM

## 2021-05-04 LAB
ALBUMIN SERPL BCP-MCNC: 3.6 G/DL (ref 3.5–5.2)
ALP SERPL-CCNC: 116 U/L (ref 55–135)
ALT SERPL W/O P-5'-P-CCNC: 41 U/L (ref 10–44)
ANION GAP SERPL CALC-SCNC: 9 MMOL/L (ref 8–16)
AST SERPL-CCNC: 26 U/L (ref 10–40)
B-OH-BUTYR BLD STRIP-SCNC: 0.1 MMOL/L (ref 0–0.5)
BACTERIA #/AREA URNS HPF: NORMAL /HPF
BASOPHILS # BLD AUTO: 0.06 K/UL (ref 0–0.2)
BASOPHILS NFR BLD: 0.6 % (ref 0–1.9)
BILIRUB SERPL-MCNC: 0.3 MG/DL (ref 0.1–1)
BILIRUB UR QL STRIP: NEGATIVE
BNP SERPL-MCNC: <10 PG/ML (ref 0–99)
BUN SERPL-MCNC: 10 MG/DL (ref 8–23)
CALCIUM SERPL-MCNC: 10.4 MG/DL (ref 8.7–10.5)
CHLORIDE SERPL-SCNC: 97 MMOL/L (ref 95–110)
CLARITY UR: CLEAR
CO2 SERPL-SCNC: 31 MMOL/L (ref 23–29)
COLOR UR: COLORLESS
CREAT SERPL-MCNC: 1.4 MG/DL (ref 0.5–1.4)
CTP QC/QA: YES
DIFFERENTIAL METHOD: NORMAL
EOSINOPHIL # BLD AUTO: 0.3 K/UL (ref 0–0.5)
EOSINOPHIL NFR BLD: 2.4 % (ref 0–8)
ERYTHROCYTE [DISTWIDTH] IN BLOOD BY AUTOMATED COUNT: 12.3 % (ref 11.5–14.5)
EST. GFR  (AFRICAN AMERICAN): >60 ML/MIN/1.73 M^2
EST. GFR  (NON AFRICAN AMERICAN): 54 ML/MIN/1.73 M^2
GLUCOSE SERPL-MCNC: 632 MG/DL (ref 70–110)
GLUCOSE UR QL STRIP: ABNORMAL
HCT VFR BLD AUTO: 47 % (ref 40–54)
HGB BLD-MCNC: 15.5 G/DL (ref 14–18)
HGB UR QL STRIP: NEGATIVE
IMM GRANULOCYTES # BLD AUTO: 0.04 K/UL (ref 0–0.04)
IMM GRANULOCYTES NFR BLD AUTO: 0.4 % (ref 0–0.5)
KETONES UR QL STRIP: NEGATIVE
LEUKOCYTE ESTERASE UR QL STRIP: NEGATIVE
LYMPHOCYTES # BLD AUTO: 2.7 K/UL (ref 1–4.8)
LYMPHOCYTES NFR BLD: 25.6 % (ref 18–48)
MAGNESIUM SERPL-MCNC: 1.9 MG/DL (ref 1.6–2.6)
MCH RBC QN AUTO: 29.6 PG (ref 27–31)
MCHC RBC AUTO-ENTMCNC: 33 G/DL (ref 32–36)
MCV RBC AUTO: 90 FL (ref 82–98)
MICROSCOPIC COMMENT: NORMAL
MONOCYTES # BLD AUTO: 0.9 K/UL (ref 0.3–1)
MONOCYTES NFR BLD: 8.5 % (ref 4–15)
NEUTROPHILS # BLD AUTO: 6.5 K/UL (ref 1.8–7.7)
NEUTROPHILS NFR BLD: 62.5 % (ref 38–73)
NITRITE UR QL STRIP: NEGATIVE
NRBC BLD-RTO: 0 /100 WBC
PH UR STRIP: 5 [PH] (ref 5–8)
PHOSPHATE SERPL-MCNC: 3.7 MG/DL (ref 2.7–4.5)
PLATELET # BLD AUTO: 211 K/UL (ref 150–450)
PMV BLD AUTO: 11.5 FL (ref 9.2–12.9)
POCT GLUCOSE: 357 MG/DL (ref 70–110)
POCT GLUCOSE: >500 MG/DL (ref 70–110)
POTASSIUM SERPL-SCNC: 4.5 MMOL/L (ref 3.5–5.1)
PROT SERPL-MCNC: 7.8 G/DL (ref 6–8.4)
PROT UR QL STRIP: NEGATIVE
RBC # BLD AUTO: 5.23 M/UL (ref 4.6–6.2)
RBC #/AREA URNS HPF: 1 /HPF (ref 0–4)
SARS-COV-2 RDRP RESP QL NAA+PROBE: NEGATIVE
SODIUM SERPL-SCNC: 137 MMOL/L (ref 136–145)
SP GR UR STRIP: >1.03 (ref 1–1.03)
SQUAMOUS #/AREA URNS HPF: 1 /HPF
TROPONIN I SERPL DL<=0.01 NG/ML-MCNC: 0.01 NG/ML (ref 0–0.03)
URN SPEC COLLECT METH UR: ABNORMAL
UROBILINOGEN UR STRIP-ACNC: NEGATIVE EU/DL
WBC # BLD AUTO: 10.46 K/UL (ref 3.9–12.7)
YEAST URNS QL MICRO: NORMAL

## 2021-05-04 PROCEDURE — 80053 COMPREHEN METABOLIC PANEL: CPT | Performed by: NURSE PRACTITIONER

## 2021-05-04 PROCEDURE — 25000003 PHARM REV CODE 250: Performed by: EMERGENCY MEDICINE

## 2021-05-04 PROCEDURE — 25000242 PHARM REV CODE 250 ALT 637 W/ HCPCS: Performed by: EMERGENCY MEDICINE

## 2021-05-04 PROCEDURE — 63600175 PHARM REV CODE 636 W HCPCS: Performed by: EMERGENCY MEDICINE

## 2021-05-04 PROCEDURE — 94761 N-INVAS EAR/PLS OXIMETRY MLT: CPT

## 2021-05-04 PROCEDURE — 93005 ELECTROCARDIOGRAM TRACING: CPT

## 2021-05-04 PROCEDURE — 82962 GLUCOSE BLOOD TEST: CPT

## 2021-05-04 PROCEDURE — 82010 KETONE BODYS QUAN: CPT | Performed by: NURSE PRACTITIONER

## 2021-05-04 PROCEDURE — 85025 COMPLETE CBC W/AUTO DIFF WBC: CPT | Performed by: NURSE PRACTITIONER

## 2021-05-04 PROCEDURE — 83735 ASSAY OF MAGNESIUM: CPT | Performed by: NURSE PRACTITIONER

## 2021-05-04 PROCEDURE — 99285 EMERGENCY DEPT VISIT HI MDM: CPT | Mod: 25

## 2021-05-04 PROCEDURE — 84100 ASSAY OF PHOSPHORUS: CPT | Performed by: NURSE PRACTITIONER

## 2021-05-04 PROCEDURE — 93010 EKG 12-LEAD: ICD-10-PCS | Mod: ,,, | Performed by: INTERNAL MEDICINE

## 2021-05-04 PROCEDURE — U0002 COVID-19 LAB TEST NON-CDC: HCPCS | Performed by: EMERGENCY MEDICINE

## 2021-05-04 PROCEDURE — 94640 AIRWAY INHALATION TREATMENT: CPT

## 2021-05-04 PROCEDURE — 25000003 PHARM REV CODE 250: Performed by: NURSE PRACTITIONER

## 2021-05-04 PROCEDURE — 83880 ASSAY OF NATRIURETIC PEPTIDE: CPT | Performed by: NURSE PRACTITIONER

## 2021-05-04 PROCEDURE — 84484 ASSAY OF TROPONIN QUANT: CPT | Performed by: NURSE PRACTITIONER

## 2021-05-04 PROCEDURE — 96360 HYDRATION IV INFUSION INIT: CPT

## 2021-05-04 PROCEDURE — 81000 URINALYSIS NONAUTO W/SCOPE: CPT | Performed by: NURSE PRACTITIONER

## 2021-05-04 PROCEDURE — 96372 THER/PROPH/DIAG INJ SC/IM: CPT | Mod: 59

## 2021-05-04 PROCEDURE — 93010 ELECTROCARDIOGRAM REPORT: CPT | Mod: ,,, | Performed by: INTERNAL MEDICINE

## 2021-05-04 RX ORDER — IPRATROPIUM BROMIDE AND ALBUTEROL SULFATE 2.5; .5 MG/3ML; MG/3ML
3 SOLUTION RESPIRATORY (INHALATION)
Status: COMPLETED | OUTPATIENT
Start: 2021-05-04 | End: 2021-05-04

## 2021-05-04 RX ORDER — METFORMIN HYDROCHLORIDE 500 MG/1
500 TABLET ORAL ONCE
Status: DISCONTINUED | OUTPATIENT
Start: 2021-05-05 | End: 2021-05-05

## 2021-05-04 RX ADMIN — SODIUM CHLORIDE 1000 ML: 0.9 INJECTION, SOLUTION INTRAVENOUS at 09:05

## 2021-05-04 RX ADMIN — IPRATROPIUM BROMIDE AND ALBUTEROL SULFATE 3 ML: .5; 3 SOLUTION RESPIRATORY (INHALATION) at 10:05

## 2021-05-04 RX ADMIN — INSULIN HUMAN 15 UNITS: 100 INJECTION, SOLUTION PARENTERAL at 09:05

## 2021-05-05 VITALS
TEMPERATURE: 98 F | HEIGHT: 67 IN | OXYGEN SATURATION: 100 % | HEART RATE: 82 BPM | SYSTOLIC BLOOD PRESSURE: 142 MMHG | WEIGHT: 275 LBS | DIASTOLIC BLOOD PRESSURE: 82 MMHG | RESPIRATION RATE: 18 BRPM | BODY MASS INDEX: 43.16 KG/M2

## 2021-05-05 LAB
POCT GLUCOSE: 281 MG/DL (ref 70–110)
POCT GLUCOSE: 338 MG/DL (ref 70–110)

## 2021-05-05 PROCEDURE — 25000003 PHARM REV CODE 250: Performed by: EMERGENCY MEDICINE

## 2021-05-05 PROCEDURE — 63600175 PHARM REV CODE 636 W HCPCS: Performed by: EMERGENCY MEDICINE

## 2021-05-05 PROCEDURE — 96372 THER/PROPH/DIAG INJ SC/IM: CPT

## 2021-05-05 RX ORDER — METFORMIN HYDROCHLORIDE 500 MG/1
500 TABLET ORAL 2 TIMES DAILY WITH MEALS
Qty: 60 TABLET | Refills: 0 | Status: SHIPPED | OUTPATIENT
Start: 2021-05-05

## 2021-05-05 RX ORDER — LIRAGLUTIDE 6 MG/ML
0.6 INJECTION SUBCUTANEOUS DAILY
Qty: 1 PEN | Refills: 1 | Status: SHIPPED | OUTPATIENT
Start: 2021-05-05

## 2021-05-05 RX ORDER — METFORMIN HYDROCHLORIDE 500 MG/1
500 TABLET ORAL ONCE
Status: COMPLETED | OUTPATIENT
Start: 2021-05-05 | End: 2021-05-05

## 2021-05-05 RX ADMIN — INSULIN HUMAN 4 UNITS: 100 INJECTION, SOLUTION PARENTERAL at 01:05

## 2021-05-05 RX ADMIN — METFORMIN HYDROCHLORIDE 500 MG: 500 TABLET ORAL at 12:05

## 2021-05-11 NOTE — ED TRIAGE NOTES
Muscle spasms getting worse right arm and gets stuck says close to chest  For a few years  Says gets indra horses to leg and    Bexarotene Counseling:  I discussed with the patient the risks of bexarotene including but not limited to hair loss, dry lips/skin/eyes, liver abnormalities, hyperlipidemia, pancreatitis, depression/suicidal ideation, photosensitivity, drug rash/allergic reactions, hypothyroidism, anemia, leukopenia, infection, cataracts, and teratogenicity.  Patient understands that they will need regular blood tests to check lipid profile, liver function tests, white blood cell count, thyroid function tests and pregnancy test if applicable.

## 2021-10-07 ENCOUNTER — HOSPITAL ENCOUNTER (EMERGENCY)
Facility: HOSPITAL | Age: 61
Discharge: ANOTHER HOSPITAL | End: 2021-10-07
Attending: EMERGENCY MEDICINE
Payer: MEDICAID

## 2021-10-07 VITALS
BODY MASS INDEX: 44.73 KG/M2 | SYSTOLIC BLOOD PRESSURE: 147 MMHG | HEIGHT: 67 IN | OXYGEN SATURATION: 97 % | RESPIRATION RATE: 28 BRPM | DIASTOLIC BLOOD PRESSURE: 77 MMHG | WEIGHT: 285 LBS | TEMPERATURE: 100 F | HEART RATE: 94 BPM

## 2021-10-07 DIAGNOSIS — T59.811A SMOKE INHALATION: ICD-10-CM

## 2021-10-07 DIAGNOSIS — T58.91XA TOXIC EFFECT OF CARBON MONOXIDE, UNINTENTIONAL, INITIAL ENCOUNTER: ICD-10-CM

## 2021-10-07 DIAGNOSIS — R06.02 SOB (SHORTNESS OF BREATH): ICD-10-CM

## 2021-10-07 DIAGNOSIS — T14.90XA INHALATION INJURY: Primary | ICD-10-CM

## 2021-10-07 LAB
ALBUMIN SERPL BCP-MCNC: 3.5 G/DL (ref 3.5–5.2)
ALLENS TEST: ABNORMAL
ALP SERPL-CCNC: 108 U/L (ref 55–135)
ALT SERPL W/O P-5'-P-CCNC: 19 U/L (ref 10–44)
ANION GAP SERPL CALC-SCNC: 9 MMOL/L (ref 8–16)
AST SERPL-CCNC: 14 U/L (ref 10–40)
BACTERIA #/AREA URNS HPF: NORMAL /HPF
BASOPHILS # BLD AUTO: 0.06 K/UL (ref 0–0.2)
BASOPHILS NFR BLD: 0.5 % (ref 0–1.9)
BILIRUB SERPL-MCNC: 0.2 MG/DL (ref 0.1–1)
BILIRUB UR QL STRIP: NEGATIVE
BUN SERPL-MCNC: 13 MG/DL (ref 8–23)
CALCIUM SERPL-MCNC: 10.5 MG/DL (ref 8.7–10.5)
CARBOXYHEMOGLOBIN: 9.1 % (ref 1.5–5)
CHLORIDE SERPL-SCNC: 101 MMOL/L (ref 95–110)
CLARITY UR: CLEAR
CO2 SERPL-SCNC: 26 MMOL/L (ref 23–29)
COLOR UR: COLORLESS
CREAT SERPL-MCNC: 1.3 MG/DL (ref 0.5–1.4)
CTP QC/QA: YES
DELSYS: ABNORMAL
DIFFERENTIAL METHOD: ABNORMAL
EOSINOPHIL # BLD AUTO: 0.2 K/UL (ref 0–0.5)
EOSINOPHIL NFR BLD: 1.6 % (ref 0–8)
ERYTHROCYTE [DISTWIDTH] IN BLOOD BY AUTOMATED COUNT: 12.4 % (ref 11.5–14.5)
ERYTHROCYTE [SEDIMENTATION RATE] IN BLOOD BY WESTERGREN METHOD: 24 MM/H
EST. GFR  (AFRICAN AMERICAN): >60 ML/MIN/1.73 M^2
EST. GFR  (NON AFRICAN AMERICAN): 59 ML/MIN/1.73 M^2
FIO2: 100
GLUCOSE SERPL-MCNC: 424 MG/DL (ref 70–110)
GLUCOSE UR QL STRIP: ABNORMAL
HCO3 UR-SCNC: 33.7 MMOL/L (ref 24–28)
HCT VFR BLD AUTO: 49.1 % (ref 40–54)
HGB BLD-MCNC: 15.6 G/DL (ref 14–18)
HGB UR QL STRIP: ABNORMAL
HYALINE CASTS #/AREA URNS LPF: 0 /LPF
IMM GRANULOCYTES # BLD AUTO: 0.03 K/UL (ref 0–0.04)
IMM GRANULOCYTES NFR BLD AUTO: 0.2 % (ref 0–0.5)
KETONES UR QL STRIP: NEGATIVE
LACTATE SERPL-SCNC: 1.8 MMOL/L (ref 0.5–2.2)
LEUKOCYTE ESTERASE UR QL STRIP: NEGATIVE
LYMPHOCYTES # BLD AUTO: 3 K/UL (ref 1–4.8)
LYMPHOCYTES NFR BLD: 23.3 % (ref 18–48)
MAGNESIUM SERPL-MCNC: 1.8 MG/DL (ref 1.6–2.6)
MCH RBC QN AUTO: 29.6 PG (ref 27–31)
MCHC RBC AUTO-ENTMCNC: 31.8 G/DL (ref 32–36)
MCV RBC AUTO: 93 FL (ref 82–98)
MICROSCOPIC COMMENT: NORMAL
MIN VOL: 10
MODE: ABNORMAL
MONOCYTES # BLD AUTO: 1.2 K/UL (ref 0.3–1)
MONOCYTES NFR BLD: 9.2 % (ref 4–15)
NEUTROPHILS # BLD AUTO: 8.5 K/UL (ref 1.8–7.7)
NEUTROPHILS NFR BLD: 65.2 % (ref 38–73)
NITRITE UR QL STRIP: NEGATIVE
NRBC BLD-RTO: 0 /100 WBC
PCO2 BLDA: 80.4 MMHG (ref 35–45)
PEEP: 5
PH SMN: 7.23 [PH] (ref 7.35–7.45)
PH UR STRIP: 6 [PH] (ref 5–8)
PIP: 34
PLATELET # BLD AUTO: 263 K/UL (ref 150–450)
PMV BLD AUTO: 10.2 FL (ref 9.2–12.9)
PO2 BLDA: 219 MMHG (ref 80–100)
POC BE: 3 MMOL/L
POC SATURATED O2: 100 % (ref 95–100)
POC TCO2: 36 MMOL/L (ref 23–27)
POTASSIUM SERPL-SCNC: 4.1 MMOL/L (ref 3.5–5.1)
PROT SERPL-MCNC: 8.3 G/DL (ref 6–8.4)
PROT UR QL STRIP: ABNORMAL
RBC # BLD AUTO: 5.27 M/UL (ref 4.6–6.2)
RBC #/AREA URNS HPF: 1 /HPF (ref 0–4)
SAMPLE: ABNORMAL
SARS-COV-2 RDRP RESP QL NAA+PROBE: NEGATIVE
SITE: ABNORMAL
SODIUM SERPL-SCNC: 136 MMOL/L (ref 136–145)
SP GR UR STRIP: 1.03 (ref 1–1.03)
SP02: 100
URN SPEC COLLECT METH UR: ABNORMAL
UROBILINOGEN UR STRIP-ACNC: NEGATIVE EU/DL
VT: 500
WBC # BLD AUTO: 12.99 K/UL (ref 3.9–12.7)
WBC #/AREA URNS HPF: 1 /HPF (ref 0–5)
YEAST URNS QL MICRO: NORMAL

## 2021-10-07 PROCEDURE — 31500 INSERT EMERGENCY AIRWAY: CPT

## 2021-10-07 PROCEDURE — 82375 ASSAY CARBOXYHB QUANT: CPT

## 2021-10-07 PROCEDURE — 82803 BLOOD GASES ANY COMBINATION: CPT

## 2021-10-07 PROCEDURE — 93010 EKG 12-LEAD: ICD-10-PCS | Mod: ,,, | Performed by: INTERNAL MEDICINE

## 2021-10-07 PROCEDURE — 25000003 PHARM REV CODE 250: Performed by: EMERGENCY MEDICINE

## 2021-10-07 PROCEDURE — 93010 ELECTROCARDIOGRAM REPORT: CPT | Mod: ,,, | Performed by: INTERNAL MEDICINE

## 2021-10-07 PROCEDURE — 96366 THER/PROPH/DIAG IV INF ADDON: CPT

## 2021-10-07 PROCEDURE — 96375 TX/PRO/DX INJ NEW DRUG ADDON: CPT

## 2021-10-07 PROCEDURE — 63600175 PHARM REV CODE 636 W HCPCS: Performed by: EMERGENCY MEDICINE

## 2021-10-07 PROCEDURE — 83605 ASSAY OF LACTIC ACID: CPT | Performed by: EMERGENCY MEDICINE

## 2021-10-07 PROCEDURE — 51702 INSERT TEMP BLADDER CATH: CPT | Mod: 59

## 2021-10-07 PROCEDURE — 93005 ELECTROCARDIOGRAM TRACING: CPT

## 2021-10-07 PROCEDURE — 80053 COMPREHEN METABOLIC PANEL: CPT | Performed by: PHYSICIAN ASSISTANT

## 2021-10-07 PROCEDURE — 82600 ASSAY OF CYANIDE: CPT | Performed by: EMERGENCY MEDICINE

## 2021-10-07 PROCEDURE — U0002 COVID-19 LAB TEST NON-CDC: HCPCS | Performed by: PHYSICIAN ASSISTANT

## 2021-10-07 PROCEDURE — 81000 URINALYSIS NONAUTO W/SCOPE: CPT | Performed by: PHYSICIAN ASSISTANT

## 2021-10-07 PROCEDURE — 25000003 PHARM REV CODE 250: Performed by: PHYSICIAN ASSISTANT

## 2021-10-07 PROCEDURE — 36600 WITHDRAWAL OF ARTERIAL BLOOD: CPT

## 2021-10-07 PROCEDURE — 83735 ASSAY OF MAGNESIUM: CPT | Performed by: PHYSICIAN ASSISTANT

## 2021-10-07 PROCEDURE — 85025 COMPLETE CBC W/AUTO DIFF WBC: CPT | Performed by: PHYSICIAN ASSISTANT

## 2021-10-07 PROCEDURE — 96365 THER/PROPH/DIAG IV INF INIT: CPT

## 2021-10-07 PROCEDURE — 63600175 PHARM REV CODE 636 W HCPCS: Performed by: PHYSICIAN ASSISTANT

## 2021-10-07 PROCEDURE — 99291 CRITICAL CARE FIRST HOUR: CPT | Mod: 25

## 2021-10-07 PROCEDURE — 94002 VENT MGMT INPAT INIT DAY: CPT

## 2021-10-07 RX ORDER — METHYLPREDNISOLONE SOD SUCC 125 MG
125 VIAL (EA) INJECTION
Status: COMPLETED | OUTPATIENT
Start: 2021-10-07 | End: 2021-10-07

## 2021-10-07 RX ORDER — PROPOFOL 10 MG/ML
20 INJECTION, EMULSION INTRAVENOUS
Status: COMPLETED | OUTPATIENT
Start: 2021-10-07 | End: 2021-10-07

## 2021-10-07 RX ORDER — ETOMIDATE 2 MG/ML
20 INJECTION INTRAVENOUS
Status: DISCONTINUED | OUTPATIENT
Start: 2021-10-07 | End: 2021-10-07

## 2021-10-07 RX ORDER — PROPOFOL 10 MG/ML
INJECTION, EMULSION INTRAVENOUS
Status: DISCONTINUED
Start: 2021-10-07 | End: 2021-10-07 | Stop reason: HOSPADM

## 2021-10-07 RX ORDER — IPRATROPIUM BROMIDE AND ALBUTEROL SULFATE 2.5; .5 MG/3ML; MG/3ML
3 SOLUTION RESPIRATORY (INHALATION)
Status: DISCONTINUED | OUTPATIENT
Start: 2021-10-07 | End: 2021-10-07 | Stop reason: HOSPADM

## 2021-10-07 RX ORDER — ETOMIDATE 2 MG/ML
INJECTION INTRAVENOUS
Status: DISCONTINUED
Start: 2021-10-07 | End: 2021-10-07 | Stop reason: HOSPADM

## 2021-10-07 RX ORDER — ETOMIDATE 2 MG/ML
40 INJECTION INTRAVENOUS
Status: COMPLETED | OUTPATIENT
Start: 2021-10-07 | End: 2021-10-07

## 2021-10-07 RX ORDER — FENTANYL CITRATE-0.9 % NACL/PF 10 MCG/ML
0-250 PLASTIC BAG, INJECTION (ML) INTRAVENOUS CONTINUOUS
Status: DISCONTINUED | OUTPATIENT
Start: 2021-10-07 | End: 2021-10-07 | Stop reason: HOSPADM

## 2021-10-07 RX ORDER — KETAMINE HYDROCHLORIDE 100 MG/ML
1 INJECTION, SOLUTION INTRAMUSCULAR; INTRAVENOUS
Status: COMPLETED | OUTPATIENT
Start: 2021-10-07 | End: 2021-10-07

## 2021-10-07 RX ORDER — ETOMIDATE 2 MG/ML
30 INJECTION INTRAVENOUS
Status: DISCONTINUED | OUTPATIENT
Start: 2021-10-07 | End: 2021-10-07

## 2021-10-07 RX ORDER — ROCURONIUM BROMIDE 10 MG/ML
1 INJECTION, SOLUTION INTRAVENOUS
Status: COMPLETED | OUTPATIENT
Start: 2021-10-07 | End: 2021-10-07

## 2021-10-07 RX ADMIN — PROPOFOL 20 MCG/KG/MIN: 10 INJECTION, EMULSION INTRAVENOUS at 05:10

## 2021-10-07 RX ADMIN — KETAMINE HYDROCHLORIDE 260 MG: 100 INJECTION INTRAMUSCULAR; INTRAVENOUS at 05:10

## 2021-10-07 RX ADMIN — METHYLPREDNISOLONE SODIUM SUCCINATE 125 MG: 125 INJECTION, POWDER, FOR SOLUTION INTRAMUSCULAR; INTRAVENOUS at 05:10

## 2021-10-07 RX ADMIN — ETOMIDATE 40 MG: 2 INJECTION INTRAVENOUS at 05:10

## 2021-10-07 RX ADMIN — Medication 100 MCG/HR: at 07:10

## 2021-10-07 RX ADMIN — ROCURONIUM BROMIDE 129 MG: 10 INJECTION, SOLUTION INTRAVENOUS at 05:10

## 2021-10-07 RX ADMIN — SODIUM CHLORIDE 1000 ML: 9 INJECTION, SOLUTION INTRAVENOUS at 07:10

## 2021-10-07 RX ADMIN — PROPOFOL 100 MCG/KG/MIN: 10 INJECTION, EMULSION INTRAVENOUS at 06:10

## 2021-10-18 LAB
CYANIDE BLD-MCNC: NORMAL MCG/ML
REF LAB TEST REF RANGE: 0.05 MCG/ML

## 2022-01-01 ENCOUNTER — HOSPITAL ENCOUNTER (EMERGENCY)
Facility: HOSPITAL | Age: 62
Discharge: HOME | End: 2022-01-02
Attending: EMERGENCY MEDICINE
Payer: MEDICAID

## 2022-01-01 DIAGNOSIS — N20.1 URETEROLITHIASIS: Primary | ICD-10-CM

## 2022-01-01 DIAGNOSIS — R10.9 ABDOMINAL PAIN: ICD-10-CM

## 2022-01-01 LAB
ALBUMIN SERPL BCP-MCNC: 3.4 G/DL (ref 3.5–5.2)
ALP SERPL-CCNC: 99 U/L (ref 55–135)
ALT SERPL W/O P-5'-P-CCNC: 18 U/L (ref 10–44)
ANION GAP SERPL CALC-SCNC: 5 MMOL/L (ref 8–16)
AST SERPL-CCNC: 13 U/L (ref 10–40)
BACTERIA #/AREA URNS HPF: NORMAL /HPF
BASOPHILS # BLD AUTO: 0.05 K/UL (ref 0–0.2)
BASOPHILS NFR BLD: 0.3 % (ref 0–1.9)
BILIRUB SERPL-MCNC: 0.9 MG/DL (ref 0.1–1)
BILIRUB UR QL STRIP: NEGATIVE
BUN SERPL-MCNC: 14 MG/DL (ref 8–23)
CALCIUM SERPL-MCNC: 10.1 MG/DL (ref 8.7–10.5)
CHLORIDE SERPL-SCNC: 99 MMOL/L (ref 95–110)
CLARITY UR: CLEAR
CO2 SERPL-SCNC: 33 MMOL/L (ref 23–29)
COLOR UR: YELLOW
CREAT SERPL-MCNC: 1.5 MG/DL (ref 0.5–1.4)
CTP QC/QA: YES
DIFFERENTIAL METHOD: ABNORMAL
EOSINOPHIL # BLD AUTO: 0.1 K/UL (ref 0–0.5)
EOSINOPHIL NFR BLD: 0.6 % (ref 0–8)
ERYTHROCYTE [DISTWIDTH] IN BLOOD BY AUTOMATED COUNT: 12.1 % (ref 11.5–14.5)
EST. GFR  (AFRICAN AMERICAN): 57 ML/MIN/1.73 M^2
EST. GFR  (NON AFRICAN AMERICAN): 50 ML/MIN/1.73 M^2
GLUCOSE SERPL-MCNC: 248 MG/DL (ref 70–110)
GLUCOSE UR QL STRIP: ABNORMAL
HCT VFR BLD AUTO: 48.3 % (ref 40–54)
HGB BLD-MCNC: 15.1 G/DL (ref 14–18)
HGB UR QL STRIP: ABNORMAL
IMM GRANULOCYTES # BLD AUTO: 0.09 K/UL (ref 0–0.04)
IMM GRANULOCYTES NFR BLD AUTO: 0.5 % (ref 0–0.5)
KETONES UR QL STRIP: NEGATIVE
LACTATE SERPL-SCNC: 1.2 MMOL/L (ref 0.5–2.2)
LEUKOCYTE ESTERASE UR QL STRIP: NEGATIVE
LIPASE SERPL-CCNC: 9 U/L (ref 4–60)
LYMPHOCYTES # BLD AUTO: 2.7 K/UL (ref 1–4.8)
LYMPHOCYTES NFR BLD: 15.1 % (ref 18–48)
MCH RBC QN AUTO: 29.4 PG (ref 27–31)
MCHC RBC AUTO-ENTMCNC: 31.3 G/DL (ref 32–36)
MCV RBC AUTO: 94 FL (ref 82–98)
MICROSCOPIC COMMENT: NORMAL
MONOCYTES # BLD AUTO: 1.9 K/UL (ref 0.3–1)
MONOCYTES NFR BLD: 10.8 % (ref 4–15)
NEUTROPHILS # BLD AUTO: 12.8 K/UL (ref 1.8–7.7)
NEUTROPHILS NFR BLD: 72.7 % (ref 38–73)
NITRITE UR QL STRIP: NEGATIVE
NRBC BLD-RTO: 0 /100 WBC
PH UR STRIP: 6 [PH] (ref 5–8)
PLATELET # BLD AUTO: 238 K/UL (ref 150–450)
PMV BLD AUTO: 10.4 FL (ref 9.2–12.9)
POTASSIUM SERPL-SCNC: 4.3 MMOL/L (ref 3.5–5.1)
PROT SERPL-MCNC: 8.2 G/DL (ref 6–8.4)
PROT UR QL STRIP: ABNORMAL
RBC # BLD AUTO: 5.14 M/UL (ref 4.6–6.2)
RBC #/AREA URNS HPF: 0 /HPF (ref 0–4)
SARS-COV-2 RDRP RESP QL NAA+PROBE: NEGATIVE
SODIUM SERPL-SCNC: 137 MMOL/L (ref 136–145)
SP GR UR STRIP: 1.03 (ref 1–1.03)
URN SPEC COLLECT METH UR: ABNORMAL
UROBILINOGEN UR STRIP-ACNC: NEGATIVE EU/DL
WBC # BLD AUTO: 17.56 K/UL (ref 3.9–12.7)
YEAST URNS QL MICRO: NORMAL

## 2022-01-01 PROCEDURE — 99285 EMERGENCY DEPT VISIT HI MDM: CPT | Mod: 25

## 2022-01-01 PROCEDURE — 25000242 PHARM REV CODE 250 ALT 637 W/ HCPCS: Performed by: EMERGENCY MEDICINE

## 2022-01-01 PROCEDURE — 81000 URINALYSIS NONAUTO W/SCOPE: CPT | Performed by: NURSE PRACTITIONER

## 2022-01-01 PROCEDURE — 85025 COMPLETE CBC W/AUTO DIFF WBC: CPT | Performed by: NURSE PRACTITIONER

## 2022-01-01 PROCEDURE — 96374 THER/PROPH/DIAG INJ IV PUSH: CPT

## 2022-01-01 PROCEDURE — 83605 ASSAY OF LACTIC ACID: CPT | Performed by: EMERGENCY MEDICINE

## 2022-01-01 PROCEDURE — 25500020 PHARM REV CODE 255: Performed by: EMERGENCY MEDICINE

## 2022-01-01 PROCEDURE — 96375 TX/PRO/DX INJ NEW DRUG ADDON: CPT

## 2022-01-01 PROCEDURE — 94640 AIRWAY INHALATION TREATMENT: CPT

## 2022-01-01 PROCEDURE — U0002 COVID-19 LAB TEST NON-CDC: HCPCS | Performed by: EMERGENCY MEDICINE

## 2022-01-01 PROCEDURE — 80053 COMPREHEN METABOLIC PANEL: CPT | Performed by: NURSE PRACTITIONER

## 2022-01-01 PROCEDURE — 96361 HYDRATE IV INFUSION ADD-ON: CPT

## 2022-01-01 PROCEDURE — 83690 ASSAY OF LIPASE: CPT | Performed by: NURSE PRACTITIONER

## 2022-01-01 PROCEDURE — 63600175 PHARM REV CODE 636 W HCPCS: Performed by: EMERGENCY MEDICINE

## 2022-01-01 PROCEDURE — 25000003 PHARM REV CODE 250: Performed by: EMERGENCY MEDICINE

## 2022-01-01 RX ORDER — ONDANSETRON 2 MG/ML
4 INJECTION INTRAMUSCULAR; INTRAVENOUS
Status: COMPLETED | OUTPATIENT
Start: 2022-01-01 | End: 2022-01-01

## 2022-01-01 RX ORDER — MORPHINE SULFATE 4 MG/ML
4 INJECTION, SOLUTION INTRAMUSCULAR; INTRAVENOUS
Status: COMPLETED | OUTPATIENT
Start: 2022-01-01 | End: 2022-01-01

## 2022-01-01 RX ORDER — ONDANSETRON 4 MG/1
4 TABLET, ORALLY DISINTEGRATING ORAL EVERY 8 HOURS PRN
Qty: 20 TABLET | Refills: 0 | Status: SHIPPED | OUTPATIENT
Start: 2022-01-01

## 2022-01-01 RX ORDER — LANCETS
1 EACH MISCELLANEOUS 2 TIMES DAILY
Qty: 200 EACH | Refills: 0 | Status: SHIPPED | OUTPATIENT
Start: 2022-01-01

## 2022-01-01 RX ORDER — HYDROCODONE BITARTRATE AND ACETAMINOPHEN 5; 325 MG/1; MG/1
1 TABLET ORAL
Status: COMPLETED | OUTPATIENT
Start: 2022-01-01 | End: 2022-01-01

## 2022-01-01 RX ORDER — HYDROCODONE BITARTRATE AND ACETAMINOPHEN 5; 325 MG/1; MG/1
1 TABLET ORAL EVERY 6 HOURS PRN
Qty: 12 TABLET | Refills: 0 | Status: SHIPPED | OUTPATIENT
Start: 2022-01-01

## 2022-01-01 RX ORDER — KETOROLAC TROMETHAMINE 30 MG/ML
10 INJECTION, SOLUTION INTRAMUSCULAR; INTRAVENOUS
Status: COMPLETED | OUTPATIENT
Start: 2022-01-01 | End: 2022-01-01

## 2022-01-01 RX ORDER — TAMSULOSIN HYDROCHLORIDE 0.4 MG/1
0.4 CAPSULE ORAL DAILY
Qty: 10 CAPSULE | Refills: 0 | Status: SHIPPED | OUTPATIENT
Start: 2022-01-01 | End: 2023-01-01

## 2022-01-01 RX ORDER — IPRATROPIUM BROMIDE AND ALBUTEROL SULFATE 2.5; .5 MG/3ML; MG/3ML
3 SOLUTION RESPIRATORY (INHALATION)
Status: COMPLETED | OUTPATIENT
Start: 2022-01-01 | End: 2022-01-01

## 2022-01-01 RX ADMIN — SODIUM CHLORIDE, SODIUM LACTATE, POTASSIUM CHLORIDE, AND CALCIUM CHLORIDE 1000 ML: .6; .31; .03; .02 INJECTION, SOLUTION INTRAVENOUS at 09:01

## 2022-01-01 RX ADMIN — KETOROLAC TROMETHAMINE 10 MG: 30 INJECTION, SOLUTION INTRAMUSCULAR at 10:01

## 2022-01-01 RX ADMIN — ONDANSETRON 4 MG: 2 INJECTION INTRAMUSCULAR; INTRAVENOUS at 09:01

## 2022-01-01 RX ADMIN — HYDROCODONE BITARTRATE AND ACETAMINOPHEN 1 TABLET: 5; 325 TABLET ORAL at 10:01

## 2022-01-01 RX ADMIN — IPRATROPIUM BROMIDE AND ALBUTEROL SULFATE 3 ML: .5; 3 SOLUTION RESPIRATORY (INHALATION) at 10:01

## 2022-01-01 RX ADMIN — IOHEXOL 125 ML: 350 INJECTION, SOLUTION INTRAVENOUS at 10:01

## 2022-01-01 RX ADMIN — MORPHINE SULFATE 4 MG: 4 INJECTION, SOLUTION INTRAMUSCULAR; INTRAVENOUS at 09:01

## 2022-01-02 VITALS
TEMPERATURE: 98 F | SYSTOLIC BLOOD PRESSURE: 162 MMHG | DIASTOLIC BLOOD PRESSURE: 73 MMHG | BODY MASS INDEX: 44.56 KG/M2 | OXYGEN SATURATION: 94 % | HEART RATE: 95 BPM | HEIGHT: 68 IN | WEIGHT: 294 LBS | RESPIRATION RATE: 18 BRPM

## 2022-01-02 LAB
GLUCOSE SERPL-MCNC: 205 MG/DL (ref 70–110)
POCT GLUCOSE: 209 MG/DL (ref 70–110)

## 2022-01-02 NOTE — ED PROVIDER NOTES
Encounter Date: 1/1/2022    SCRIBE #1 NOTE: I, Deborah Brennan, am scribing for, and in the presence of,  Richard Mchugh MD. I have scribed the following portions of the note - Other sections scribed: CINDY TEMPLE.       History     Chief Complaint   Patient presents with    Abdominal Pain     Right side abdominal pain x 3 days. Denies nausea, emesis, diarrhea or problems urinating.      Altaf Caicedo is a 61 y.o male with a PMHx of diabetes mellitus, COPD, and HTN presenting to the ED with a chief complaint of RLQ abdominal pain beginning three days ago. The patient complains of RLQ abdominal pain that does not radiate and is associated with muscle spasms starting last week, increased sweating at night, productive cough, and dry mouth/lips. States he did not eat or drink today due to pain. Reports not taking his diabetes medications for the past two days and not taking his fluid pill for the past three days. No fever, chills, dysuria, hematuria, diarrhea, or frequency.     The history is provided by the patient. No  was used.     Review of patient's allergies indicates:   Allergen Reactions    Dye Rash     Past Medical History:   Diagnosis Date    COPD (chronic obstructive pulmonary disease)     Diabetes mellitus     Diverticulitis     Gout     Hyperlipemia     Hypertension      History reviewed. No pertinent surgical history.  No family history on file.  Social History     Tobacco Use    Smoking status: Current Every Day Smoker     Packs/day: 1.00    Smokeless tobacco: Never Used   Substance Use Topics    Alcohol use: No    Drug use: No     Review of Systems   Constitutional: Positive for diaphoresis.   HENT:        (+) dry mouth/lips   Eyes: Negative.    Respiratory: Positive for cough.    Gastrointestinal: Positive for abdominal pain.   Genitourinary: Negative.    Musculoskeletal:        (+) muscle spasms   Skin: Negative.    Neurological: Negative.    Psychiatric/Behavioral:  Negative.        Physical Exam     Initial Vitals [01/01/22 1859]   BP Pulse Resp Temp SpO2   (!) 165/77 105 20 99 °F (37.2 °C) (!) 92 %      MAP       --         Physical Exam    Nursing note and vitals reviewed.  Constitutional: He appears well-developed. He is not diaphoretic. He appears distressed (mildly).   HENT:   Head: Normocephalic and atraumatic.   Nose: Nose normal.   Mouth/Throat: No oropharyngeal exudate.   Eyes: EOM are normal. Pupils are equal, round, and reactive to light.   Neck: Neck supple. No tracheal deviation present. No JVD present.   Normal range of motion.  Cardiovascular: Regular rhythm, normal heart sounds and intact distal pulses.   tachycardic   Pulmonary/Chest: Breath sounds normal. No respiratory distress. He has no wheezes. He has no rhonchi. He has no rales.   Abdominal: Abdomen is soft. Bowel sounds are normal. He exhibits no distension. There is abdominal tenderness (mild over right CVA). There is no rebound and no guarding.   Musculoskeletal:         General: No tenderness or edema. Normal range of motion.      Cervical back: Normal range of motion and neck supple.     Neurological: He is alert and oriented to person, place, and time. He has normal strength.   Skin: Skin is warm and dry. Capillary refill takes less than 2 seconds. No rash noted. No erythema.         ED Course   Procedures  Labs Reviewed   CBC W/ AUTO DIFFERENTIAL - Abnormal; Notable for the following components:       Result Value    WBC 17.56 (*)     MCHC 31.3 (*)     Gran # (ANC) 12.8 (*)     Immature Grans (Abs) 0.09 (*)     Mono # 1.9 (*)     Lymph % 15.1 (*)     All other components within normal limits   COMPREHENSIVE METABOLIC PANEL - Abnormal; Notable for the following components:    CO2 33 (*)     Glucose 248 (*)     Creatinine 1.5 (*)     Albumin 3.4 (*)     Anion Gap 5 (*)     eGFR if  57 (*)     eGFR if non  50 (*)     All other components within normal limits    URINALYSIS, REFLEX TO URINE CULTURE - Abnormal; Notable for the following components:    Protein, UA Trace (*)     Glucose, UA 4+ (*)     Occult Blood UA Trace (*)     All other components within normal limits    Narrative:     Specimen Source->Urine   POCT GLUCOSE - Abnormal; Notable for the following components:    POCT Glucose 209 (*)     All other components within normal limits   LIPASE   LACTIC ACID, PLASMA   URINALYSIS MICROSCOPIC    Narrative:     Specimen Source->Urine   SARS-COV-2 RDRP GENE          Imaging Results          CT Abdomen Pelvis With Contrast (Final result)  Result time 01/01/22 22:43:32    Final result by Donta Lainez MD (01/01/22 22:43:32)                 Impression:      5 mm obstructing stone in the right proximal ureter with associated moderate right-sided hydronephrosis.  Right-sided perinephric stranding along with stranding along the course of the right ureter.    Diverticulosis coli without evidence of acute diverticulitis.    Additional findings above.      Electronically signed by: Donta Lainez MD  Date:    01/01/2022  Time:    22:43             Narrative:    EXAMINATION:  CT ABDOMEN PELVIS WITH CONTRAST    CLINICAL HISTORY:  Abdominal pain, acute, nonlocalized;    TECHNIQUE:  Low dose axial images, sagittal and coronal reformations were obtained from the lung bases to the pubic symphysis following the IV administration of 125 mL of Omnipaque 350 .  Oral contrast was not given.    COMPARISON:  None.    FINDINGS:  There are no pleural effusions.  There is no evidence of a pneumothorax.  No airspace opacity is present.  No pulmonary nodule is identified.    The heart is unremarkable.  There is normal tapering of the abdominal aorta.  There are scattered calcifications along the course of the abdominal aorta and its branch vessels.  The celiac trunk, SMA, and LINNETTE are within normal limits.  The portal veins and mesenteric veins are within normal limits.  The IVC and the remainder of  the venous structures are unremarkable.    There is no evidence of lymphadenopathy in the abdomen or pelvis.    The esophagus, stomach, and duodenum are within normal limits.  The small bowel loops are unremarkable.  The visualized portion of the appendix is within normal limits.  There is colonic diverticula without evidence of acute diverticulitis.    There is hepatic steatosis.  The gallbladder is unremarkable.  The biliary tree is within normal limits.  The spleen is unremarkable.  The pancreas is within normal limits.  The adrenal glands are unremarkable.    There is a 5 cm simple cyst in the interpolar region of the right kidney.  There are additional smaller hypodensities in the right kidney.  These are too small complete characterization.  There is a 4 cm simple cyst in the interpolar regions of the left kidney.  There is a 3 cm simple cyst in the upper pole the left kidney.  There is a 2 cm simple cyst in the lower pole the left kidney.  There is a 5 mm obstructing stone in the right proximal ureter.  There is moderate right-sided hydroureteronephrosis.  There is right-sided perinephric stranding along with stranding along the course of the right ureter.  The left ureter is unremarkable.  There is mild circumferential wall thickening of the urinary bladder.  The prostate gland is unremarkable.    There is no evidence of free fluid in the abdomen pelvis.  There is no evidence of free air.  There is no evidence of pneumatosis.  No portal venous air is identified.    The psoas margins are unremarkable.  There are bilateral fat containing inguinal hernias.  There are degenerative changes in the osseous structures.  There is sclerosis involving the L4-5 endplates.  No definitive erosion is identified.                               X-Ray Chest 1 View (Final result)  Result time 01/01/22 21:23:18    Final result by Zulema Haley MD (01/01/22 21:23:18)                 Impression:      Mild left basilar  atelectasis or scarring, otherwise no acute cardiopulmonary process identified.      Electronically signed by: Zulema Haley MD  Date:    01/01/2022  Time:    21:23             Narrative:    EXAMINATION:  XR CHEST 1 VIEW    CLINICAL HISTORY:  Unspecified abdominal pain    TECHNIQUE:  Single frontal view of the chest was performed.    COMPARISON:  10/07/2021.    FINDINGS:  Cardiac silhouette is normal in size.  Lungs are symmetrically expanded.  Mild coarse interstitial lung changes are seen.  There is minimal left basilar atelectasis or scarring.  Otherwise no evidence of new focal consolidative process, pneumothorax, or significant pleural effusion.  No acute osseous abnormality identified.                                 Medications   albuterol-ipratropium 2.5 mg-0.5 mg/3 mL nebulizer solution 3 mL (3 mLs Nebulization Given 1/1/22 2201)   lactated ringers bolus 1,000 mL (0 mLs Intravenous Stopped 1/2/22 0010)   ondansetron injection 4 mg (4 mg Intravenous Given 1/1/22 2105)   morphine injection 4 mg (4 mg Intravenous Given 1/1/22 2106)   iohexoL (OMNIPAQUE 350) injection 125 mL (125 mLs Intravenous Given 1/1/22 2209)   ketorolac injection 9.999 mg (9.999 mg Intravenous Given 1/1/22 2259)   HYDROcodone-acetaminophen 5-325 mg per tablet 1 tablet (1 tablet Oral Given 1/1/22 2257)     Medical Decision Making:   History:   Old Medical Records: I decided to obtain old medical records.  Clinical Tests:   Lab Tests: Ordered and Reviewed  Radiological Study: Ordered and Reviewed    MDM:    61 y.o.male with PMHx as noted above presents with abdominal pain. Physical exam as noted above.  ED workup remarkable for CBC/CMP: WBC 17.56, lipase - 9, LA - 1.2, preg - negative, u/a - unremarkble, CT a/p showing 5mm obstructing stone right ureter.  Pt presentation consistent with right-sided ureterolithiasis, moderate hydro noted, urinalysis not showing any evidence of active infection.  Leukocytosis likely related to dehydration,  recent hyperglycemia.  Patient given IV fluids, pain medication, nausea medication with reassessment showing patient extremely more comfortable.  Advised on follow-up with Urology.  At this time given patient's history, physical exam, and ED workup do not suspect septic ureterolithiasis, pancreatitis, acute MI/ACS, cholecystitis, appendicitis, SBO, acute hepatitis, pyelonephritis, ureterolithiasis, or any further malignant cause.  Discussed diagnosis and further treatment with patient including f/u.  Return precautions given and all questions answered.  Patient in understanding of plan.  Pt discharged to home improved and stable.            Scribe Attestation:   Scribe #1: I performed the above scribed service and the documentation accurately describes the services I performed. I attest to the accuracy of the note.                 Clinical Impression:   Final diagnoses:  [R10.9] Abdominal pain  [N20.1] Ureterolithiasis (Primary)        I, Richard Mchugh M.D., personally performed the services described in this documentation. All medical record entries made by the scribe were at my direction and in my presence. I have reviewed the chart and agree that the record reflects my personal performance and is accurate and complete.       ED Disposition Condition    Discharge Stable        ED Prescriptions     Medication Sig Dispense Start Date End Date Auth. Provider    tamsulosin (FLOMAX) 0.4 mg Cap Take 1 capsule (0.4 mg total) by mouth once daily. 10 capsule 1/1/2022 1/1/2023 Richard Mchugh MD    HYDROcodone-acetaminophen (NORCO) 5-325 mg per tablet Take 1 tablet by mouth every 6 (six) hours as needed for Pain. 12 tablet 1/1/2022  Richard Mchugh MD    ondansetron (ZOFRAN-ODT) 4 MG TbDL Take 1 tablet (4 mg total) by mouth every 8 (eight) hours as needed (nausea). 20 tablet 1/1/2022  Richard Mchugh MD    lancets (ONETOUCH ULTRASOFT LANCETS) Misc 1 application by Misc.(Non-Drug; Combo Route) route 2  (two) times a day. 200 each 1/1/2022  Richard Mchugh MD        Follow-up Information     Follow up With Specialties Details Why Contact Info    [facility] Emergency Dept Emergency Medicine Go to  If symptoms worsen [address]  [place] [address]  [phone]           Richard Mchugh MD  01/02/22 [id number]

## 2022-01-02 NOTE — ED TRIAGE NOTES
Pt presents to ED via personal transportation c/o right sided abd pain that radiates to right flank.  Pt also c/o constipation, productive cough, dry mouth and increased sweating at night.  Denies cp, sob, n/v/d, or urinary symptoms.  Hx of copd, htn and dm. Pain 10/10.